# Patient Record
Sex: FEMALE | Race: OTHER | NOT HISPANIC OR LATINO | URBAN - METROPOLITAN AREA
[De-identification: names, ages, dates, MRNs, and addresses within clinical notes are randomized per-mention and may not be internally consistent; named-entity substitution may affect disease eponyms.]

---

## 2018-02-16 ENCOUNTER — INPATIENT (INPATIENT)
Facility: HOSPITAL | Age: 83
LOS: 18 days | Discharge: HOME | DRG: 871 | End: 2018-03-07
Attending: HOSPITALIST | Admitting: HOSPITALIST
Payer: MEDICAID

## 2018-02-16 VITALS
HEART RATE: 78 BPM | SYSTOLIC BLOOD PRESSURE: 137 MMHG | TEMPERATURE: 98 F | OXYGEN SATURATION: 97 % | RESPIRATION RATE: 28 BRPM | DIASTOLIC BLOOD PRESSURE: 59 MMHG

## 2018-02-16 LAB
ALBUMIN SERPL ELPH-MCNC: 2.2 G/DL — LOW (ref 3.3–5.2)
ALP SERPL-CCNC: 96 U/L — SIGNIFICANT CHANGE UP (ref 40–120)
ALT FLD-CCNC: 33 U/L — HIGH
ANION GAP SERPL CALC-SCNC: 11 MMOL/L — SIGNIFICANT CHANGE UP (ref 5–17)
AST SERPL-CCNC: 34 U/L — HIGH
BASOPHILS # BLD AUTO: 0 K/UL — SIGNIFICANT CHANGE UP (ref 0–0.2)
BASOPHILS NFR BLD AUTO: 0.2 % — SIGNIFICANT CHANGE UP (ref 0–2)
BILIRUB SERPL-MCNC: 0.3 MG/DL — LOW (ref 0.4–2)
BUN SERPL-MCNC: 29 MG/DL — HIGH (ref 8–20)
CALCIUM SERPL-MCNC: 8.6 MG/DL — SIGNIFICANT CHANGE UP (ref 8.6–10.2)
CHLORIDE SERPL-SCNC: 98 MMOL/L — SIGNIFICANT CHANGE UP (ref 98–107)
CO2 SERPL-SCNC: 26 MMOL/L — SIGNIFICANT CHANGE UP (ref 22–29)
CREAT SERPL-MCNC: 0.42 MG/DL — LOW (ref 0.5–1.3)
EOSINOPHIL # BLD AUTO: 0.6 K/UL — HIGH (ref 0–0.5)
EOSINOPHIL NFR BLD AUTO: 7 % — HIGH (ref 0–6)
GLUCOSE SERPL-MCNC: 207 MG/DL — HIGH (ref 70–115)
HCT VFR BLD CALC: 33.2 % — LOW (ref 37–47)
HGB BLD-MCNC: 10.4 G/DL — LOW (ref 12–16)
LACTATE BLDV-MCNC: 1.6 MMOL/L — SIGNIFICANT CHANGE UP (ref 0.5–2)
LYMPHOCYTES # BLD AUTO: 1.8 K/UL — SIGNIFICANT CHANGE UP (ref 1–4.8)
LYMPHOCYTES # BLD AUTO: 19.5 % — LOW (ref 20–55)
MCHC RBC-ENTMCNC: 27.7 PG — SIGNIFICANT CHANGE UP (ref 27–31)
MCHC RBC-ENTMCNC: 31.3 G/DL — LOW (ref 32–36)
MCV RBC AUTO: 88.5 FL — SIGNIFICANT CHANGE UP (ref 81–99)
MONOCYTES # BLD AUTO: 0.8 K/UL — SIGNIFICANT CHANGE UP (ref 0–0.8)
MONOCYTES NFR BLD AUTO: 9.1 % — SIGNIFICANT CHANGE UP (ref 3–10)
NEUTROPHILS # BLD AUTO: 5.8 K/UL — SIGNIFICANT CHANGE UP (ref 1.8–8)
NEUTROPHILS NFR BLD AUTO: 64.1 % — SIGNIFICANT CHANGE UP (ref 37–73)
PLATELET # BLD AUTO: 368 K/UL — SIGNIFICANT CHANGE UP (ref 150–400)
POTASSIUM SERPL-MCNC: 4.2 MMOL/L — SIGNIFICANT CHANGE UP (ref 3.5–5.3)
POTASSIUM SERPL-SCNC: 4.2 MMOL/L — SIGNIFICANT CHANGE UP (ref 3.5–5.3)
PROT SERPL-MCNC: 7.1 G/DL — SIGNIFICANT CHANGE UP (ref 6.6–8.7)
RBC # BLD: 3.75 M/UL — LOW (ref 4.4–5.2)
RBC # FLD: 15 % — SIGNIFICANT CHANGE UP (ref 11–15.6)
SODIUM SERPL-SCNC: 135 MMOL/L — SIGNIFICANT CHANGE UP (ref 135–145)
WBC # BLD: 9 K/UL — SIGNIFICANT CHANGE UP (ref 4.8–10.8)
WBC # FLD AUTO: 9 K/UL — SIGNIFICANT CHANGE UP (ref 4.8–10.8)

## 2018-02-16 PROCEDURE — 71045 X-RAY EXAM CHEST 1 VIEW: CPT | Mod: 26

## 2018-02-16 PROCEDURE — 93010 ELECTROCARDIOGRAM REPORT: CPT

## 2018-02-16 PROCEDURE — 99285 EMERGENCY DEPT VISIT HI MDM: CPT

## 2018-02-16 RX ORDER — VANCOMYCIN HCL 1 G
1000 VIAL (EA) INTRAVENOUS ONCE
Qty: 0 | Refills: 0 | Status: COMPLETED | OUTPATIENT
Start: 2018-02-16 | End: 2018-02-16

## 2018-02-16 RX ORDER — SODIUM CHLORIDE 9 MG/ML
1800 INJECTION INTRAMUSCULAR; INTRAVENOUS; SUBCUTANEOUS ONCE
Qty: 0 | Refills: 0 | Status: COMPLETED | OUTPATIENT
Start: 2018-02-16 | End: 2018-02-16

## 2018-02-16 RX ORDER — PIPERACILLIN AND TAZOBACTAM 4; .5 G/20ML; G/20ML
3.38 INJECTION, POWDER, LYOPHILIZED, FOR SOLUTION INTRAVENOUS ONCE
Qty: 0 | Refills: 0 | Status: COMPLETED | OUTPATIENT
Start: 2018-02-16 | End: 2018-02-16

## 2018-02-16 RX ORDER — SODIUM CHLORIDE 9 MG/ML
3 INJECTION INTRAMUSCULAR; INTRAVENOUS; SUBCUTANEOUS ONCE
Qty: 0 | Refills: 0 | Status: COMPLETED | OUTPATIENT
Start: 2018-02-16 | End: 2018-02-16

## 2018-02-16 RX ADMIN — Medication 250 MILLIGRAM(S): at 23:35

## 2018-02-16 RX ADMIN — SODIUM CHLORIDE 1200 MILLILITER(S): 9 INJECTION INTRAMUSCULAR; INTRAVENOUS; SUBCUTANEOUS at 21:34

## 2018-02-16 RX ADMIN — SODIUM CHLORIDE 3 MILLILITER(S): 9 INJECTION INTRAMUSCULAR; INTRAVENOUS; SUBCUTANEOUS at 21:34

## 2018-02-16 RX ADMIN — PIPERACILLIN AND TAZOBACTAM 200 GRAM(S): 4; .5 INJECTION, POWDER, LYOPHILIZED, FOR SOLUTION INTRAVENOUS at 21:33

## 2018-02-16 NOTE — ED ADULT TRIAGE NOTE - CHIEF COMPLAINT QUOTE
BIBA, no family present at triage, EMS reports family reported recent CVA and baseline is now nonverbal and confused. Family called 911 for respiratory distress. Patient noted with left visual gaze, right sided neglect, right sided weakness, non verbal. PEG tube present, indwelling catheter present with cloudy urine.

## 2018-02-16 NOTE — ED PROVIDER NOTE - SKIN, MLM
stage 4 sacral decubiti appears infected with discharge. Pt incontinent with loose brown stool tracing up to wound.

## 2018-02-16 NOTE — ED PROVIDER NOTE - PROGRESS NOTE DETAILS
Labs as noted.  Family never came to identify pt.  RN unable to replace ha because she cannot empty balloon.  Case d/w [facility]Dr. Adamson and will admit, continue Abx and supportive care

## 2018-02-16 NOTE — ED PROVIDER NOTE - UNABLE TO OBTAIN
Full history limited due to urgent need for care. Urgent need for Intervention Full ROS limited due to urgent need for care.

## 2018-02-16 NOTE — ED PROVIDER NOTE - MEDICAL DECISION MAKING DETAILS
Will do sepsis protocol, give fluids, antibiotics, obtain chest x-ray and re-evaluate. Awaiting to discuss pt status with family.

## 2018-02-16 NOTE — ED PROVIDER NOTE - OBJECTIVE STATEMENT
This is a 89 y/o F BIBA to ED for difficulty breathing. As per EMS family called due to patient belly breathing and appearing unwell. Pt with recent CVA causing R sided neglect.

## 2018-02-16 NOTE — ED ADULT NURSE NOTE - OBJECTIVE STATEMENT
as per family patient with SOB for couple days, + edema to right upper extremity, came with ha- dirty unknown when placed, patient non vewrbal

## 2018-02-17 DIAGNOSIS — Z96.0 PRESENCE OF UROGENITAL IMPLANTS: ICD-10-CM

## 2018-02-17 DIAGNOSIS — I63.9 CEREBRAL INFARCTION, UNSPECIFIED: ICD-10-CM

## 2018-02-17 DIAGNOSIS — A41.9 SEPSIS, UNSPECIFIED ORGANISM: ICD-10-CM

## 2018-02-17 DIAGNOSIS — R06.02 SHORTNESS OF BREATH: ICD-10-CM

## 2018-02-17 DIAGNOSIS — N39.0 URINARY TRACT INFECTION, SITE NOT SPECIFIED: ICD-10-CM

## 2018-02-17 DIAGNOSIS — Z93.1 GASTROSTOMY STATUS: Chronic | ICD-10-CM

## 2018-02-17 DIAGNOSIS — L89.154 PRESSURE ULCER OF SACRAL REGION, STAGE 4: ICD-10-CM

## 2018-02-17 LAB
APPEARANCE UR: ABNORMAL
BACTERIA # UR AUTO: ABNORMAL
BILIRUB UR-MCNC: NEGATIVE — SIGNIFICANT CHANGE UP
COLOR SPEC: YELLOW — SIGNIFICANT CHANGE UP
DIFF PNL FLD: ABNORMAL
EPI CELLS # UR: SIGNIFICANT CHANGE UP
GLUCOSE UR QL: NEGATIVE MG/DL — SIGNIFICANT CHANGE UP
KETONES UR-MCNC: NEGATIVE — SIGNIFICANT CHANGE UP
LEUKOCYTE ESTERASE UR-ACNC: ABNORMAL
NITRITE UR-MCNC: NEGATIVE — SIGNIFICANT CHANGE UP
PH UR: 8 — SIGNIFICANT CHANGE UP (ref 5–8)
PROT UR-MCNC: 15 MG/DL
RBC CASTS # UR COMP ASSIST: SIGNIFICANT CHANGE UP /HPF (ref 0–4)
SP GR SPEC: 1.01 — SIGNIFICANT CHANGE UP (ref 1.01–1.02)
UROBILINOGEN FLD QL: 4 MG/DL
WBC UR QL: SIGNIFICANT CHANGE UP

## 2018-02-17 PROCEDURE — 71250 CT THORAX DX C-: CPT | Mod: 26

## 2018-02-17 PROCEDURE — 12345: CPT | Mod: NC

## 2018-02-17 RX ORDER — PIPERACILLIN AND TAZOBACTAM 4; .5 G/20ML; G/20ML
3.38 INJECTION, POWDER, LYOPHILIZED, FOR SOLUTION INTRAVENOUS EVERY 8 HOURS
Qty: 0 | Refills: 0 | Status: DISCONTINUED | OUTPATIENT
Start: 2018-02-17 | End: 2018-02-19

## 2018-02-17 RX ORDER — VANCOMYCIN HCL 1 G
1000 VIAL (EA) INTRAVENOUS EVERY 12 HOURS
Qty: 0 | Refills: 0 | Status: DISCONTINUED | OUTPATIENT
Start: 2018-02-17 | End: 2018-02-18

## 2018-02-17 RX ORDER — SODIUM CHLORIDE 9 MG/ML
1000 INJECTION INTRAMUSCULAR; INTRAVENOUS; SUBCUTANEOUS
Qty: 0 | Refills: 0 | Status: DISCONTINUED | OUTPATIENT
Start: 2018-02-17 | End: 2018-02-19

## 2018-02-17 RX ORDER — ENOXAPARIN SODIUM 100 MG/ML
40 INJECTION SUBCUTANEOUS EVERY 24 HOURS
Qty: 0 | Refills: 0 | Status: DISCONTINUED | OUTPATIENT
Start: 2018-02-17 | End: 2018-02-20

## 2018-02-17 RX ADMIN — Medication 250 MILLIGRAM(S): at 23:56

## 2018-02-17 RX ADMIN — SODIUM CHLORIDE 125 MILLILITER(S): 9 INJECTION INTRAMUSCULAR; INTRAVENOUS; SUBCUTANEOUS at 17:45

## 2018-02-17 RX ADMIN — Medication 250 MILLIGRAM(S): at 13:00

## 2018-02-17 RX ADMIN — PIPERACILLIN AND TAZOBACTAM 25 GRAM(S): 4; .5 INJECTION, POWDER, LYOPHILIZED, FOR SOLUTION INTRAVENOUS at 06:33

## 2018-02-17 RX ADMIN — PIPERACILLIN AND TAZOBACTAM 25 GRAM(S): 4; .5 INJECTION, POWDER, LYOPHILIZED, FOR SOLUTION INTRAVENOUS at 17:44

## 2018-02-17 RX ADMIN — SODIUM CHLORIDE 125 MILLILITER(S): 9 INJECTION INTRAMUSCULAR; INTRAVENOUS; SUBCUTANEOUS at 06:33

## 2018-02-17 RX ADMIN — ENOXAPARIN SODIUM 40 MILLIGRAM(S): 100 INJECTION SUBCUTANEOUS at 06:34

## 2018-02-17 NOTE — CONSULT NOTE ADULT - SUBJECTIVE AND OBJECTIVE BOX
INTERVAL HPI/OVERNIGHT EVENTS:  89 yo f w/ hx of stroke, bed-bound, non-verbal presented to hospital with respiratory distress, was found to have sacral pressure ulcer stage 4, 7x8cm, w/ some necrotic tissue; medical service has asked for possible surgical debridement of the necrotic tissue.    MEDICATIONS  (STANDING):  enoxaparin Injectable 40 milliGRAM(s) SubCutaneous every 24 hours  piperacillin/tazobactam IVPB. 3.375 Gram(s) IV Intermittent every 8 hours  sodium chloride 0.9%. 1000 milliLiter(s) (125 mL/Hr) IV Continuous <Continuous>  vancomycin  IVPB 1000 milliGRAM(s) IV Intermittent every 12 hours    MEDICATIONS  (PRN):      Vital Signs Last 24 Hrs  T(C): 36.8 (2018 15:26), Max: 37.2 (2018 03:42)  T(F): 98.3 (2018 15:), Max: 99 (2018 03:42)  HR: 114 (2018 15:) (78 - 114)  BP: 144/73 (2018 15:26) (137/59 - 171/67)  BP(mean): 90 (2018 03:01) (90 - 90)  RR: 20 (2018 15:26) (16 - 28)  SpO2: 96% (2018 15:26) (96% - 100%)    PE  Gen: NAD  Pulm: no signs of respiratory distress  Abd: s/nd/nt  Back: sacral pressure ulcer stage 4, 7x8cm, w/ some necrotic tissue      I&O's Detail      LABS:                        10.4   9.0   )-----------( 368      ( 2018 21:38 )             33.2     02-16    135  |  98  |  29.0<H>  ----------------------------<  207<H>  4.2   |  26.0  |  0.42<L>    Ca    8.6      2018 21:38    TPro  7.1  /  Alb  2.2<L>  /  TBili  0.3<L>  /  DBili  x   /  AST  34<H>  /  ALT  33<H>  /  AlkPhos  96  02-16      Urinalysis Basic - ( 2018 03:13 )    Color: Yellow / Appearance: Slightly Turbid / S.010 / pH: x  Gluc: x / Ketone: Negative  / Bili: Negative / Urobili: 4 mg/dL   Blood: x / Protein: 15 mg/dL / Nitrite: Negative   Leuk Esterase: Moderate / RBC: 0-2 /HPF / WBC 3-5   Sq Epi: x / Non Sq Epi: Occasional / Bacteria: Many        RADIOLOGY & ADDITIONAL STUDIES:

## 2018-02-17 NOTE — PROGRESS NOTE ADULT - PROBLEM SELECTOR PLAN 1
Currently sat of 97% on RA, BP normal, PE is in differential however more likely had mucous plug or aspirating on oral secretions. No clear evidence of pneumonic process. Cont. aspiration precautions, will monitor.

## 2018-02-17 NOTE — H&P ADULT - PROBLEM SELECTOR PLAN 3
Likely due to sacral decub and possible catheter associated UTI. At this point need to address advanced directives and goals of care with Family when they can be reached. Patient with advanced aged, bedbound, non-verbal. Palliative care consult when family present. Lactate normal, normotensive at this time. Likely due to sacral decub. At this point need to address advanced directives and goals of care with Family when they can be reached. Patient with advanced aged, bedbound, non-verbal. Palliative care consult when family present. Lactate normal, normotensive at this time.

## 2018-02-17 NOTE — CONSULT NOTE ADULT - ASSESSMENT
89 yo f w/ hx of stroke, bed-bound, non-verbal presented to hospital with respiratory distress, was found to have sacral pressure ulcer stage 4, 7x8cm, w/ some necrotic tissue; medical service has asked for possible surgical debridement of the necrotic tissue.    pt was seen and examined at bedside. per primary team note, family not certain about goals of care, and that unsure regarding aggressive treatment/surgical intervention for this patient, and that they will decide in the near future.   - 2 phone numbers in the chart, [phone]/ [phone] were contacted,  with no family member of this patient available; Therefore no surgical intervention at this time   - Please confirm goals of care for the patient, and arrange to have family members at bedside in case they would like for patient to have extensive surgical debridement of the wound  - local wound care with santyl for now  - surgery is signing off, please reconsult prn    Discussed with Dr. Medley

## 2018-02-17 NOTE — PROGRESS NOTE ADULT - ASSESSMENT
89 y/o female with resolved SOB, Stage 4 sacral decub, Young catheter POA, Hx of CVA with right sided paresis, s/p PEG for dysphagia  caryn with patient Nephew Mr Sanchez Bello, we had long discussion about the issues and plan of care, according to him she came from [facility] in June 2017, after one [place] of arrival to [place], she got stroke and was admitted at [facility], where she was treated for 1 week, then discharged home, then she was admitted again because of SOB resulted from aspiration pneumonia  and developed another stroke and she became aphasic in end of the august, she was at [facility] Pegged and foleys cather was put in, she was in [facility] for 6 months, he does not know the reason why they kept her there for 6 month, she was discharged home with him in January, he was given her bolus feeding, he notice she was breathing hard and brought her here.   we discussed about management she is getting her for aspiration as well as stage 4 decubitus ulcer with infection, as per him at [facility] she developed stage 2 ulcer, we discussed about her advance directive, he is her only relative here and he wanted her to be DNR and DNI, he is also interested in meeting with Palliative and hospice team, will have meeting on Monday, MOLTS form filled and signed, mean while will continued with medical management and no aggressive measures.

## 2018-02-17 NOTE — PROGRESS NOTE ADULT - PROBLEM SELECTOR PLAN 3
Likely due to sacral decub, stage 4 with necrotic margin, advanced directives and goals of care discuss with Family as mentioned above, given advanced aged, bedbound, non-verbal, patient was made DNI and DNI as per family wishes, will have Hospice and Palliative care meeting on monday,  Lactate normal, normotensive at this time.

## 2018-02-17 NOTE — PROGRESS NOTE ADULT - PROBLEM SELECTOR PLAN 4
Wet to dry, frequent turning, await family to asses goals of care and if wish to proceed with aggressive care will need surgical evaluation for debridement done, will have meeting on Monday for further decision, as of now medical management.

## 2018-02-17 NOTE — H&P ADULT - PROBLEM SELECTOR PLAN 4
Wet to dry, frequent turning, await family to asses goals of care and if wish to proceed with aggressive care will need surgical evaluation for debridement.

## 2018-02-17 NOTE — H&P ADULT - HISTORY OF PRESENT ILLNESS
89 y/o female brought in by EMS from home after family noticed patient to have resp. distress. Patient family apparently came to ED before EMS got here and left some paperwork but then left and as of now have been unable to be reached. Patient has a history of admission to [facility] in 8/17 with large CVA resulting in right sided paresis, non-verbal, bedbound state. She was dcd with PEG and Ha catheter. This information was obtained from records left by family, however no other information is obtainable as family is unable to be reached. It is not know if patient has any home services, or what post hospital care has been. In ED patient with ha with turbid urine and was unable to be removed after deflating balloon and appears to have been in place for extended period of time. She also has a large 7x7 stage 4 sacral decub with tunneling and foul smelling discharge.

## 2018-02-17 NOTE — H&P ADULT - PROBLEM SELECTOR PLAN 2
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 F/U UA, will need to have urology change at this point as it is unable to be removed likely due to balloon not deflating due to crystallized sediment around it. 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 U/A not c/w active infection and more c/w chronic colonization, will need to have urology change at this point as it is unable to be removed likely due to balloon not deflating due to crystallized sediment around it.

## 2018-02-17 NOTE — PROGRESS NOTE ADULT - SUBJECTIVE AND OBJECTIVE BOX
[facility]    [id number]    89y      Female    Patient is a 89y old  Female who presents with a chief complaint of SOB (2018 03:01)      INTERVAL HPI/OVERNIGHT EVENTS:    Patient is seen and evaluated, unable to get much info from the patient as she is aphasic     REVIEW OF SYSTEMS:    Unable to get much info from the patient.      Vital Signs Last 24 Hrs  T(C): 37.2 (2018 21:55), Max: 37.2 (2018 03:42)  T(F): 98.9 (2018 21:55), Max: 99 (2018 03:42)  HR: 116 (2018 21:55) (78 - 116)  BP: 144/73 (2018 15:26) (137/67 - 171/67)  BP(mean): 90 (2018 03:01) (90 - 90)  RR: 20 (2018 21:55) (16 - 25)  SpO2: 96% (2018 15:26) (96% - 100%)    PHYSICAL EXAM:    GENERAL: Elderly female looking lethargic  NECK: soft, Supple, No JVD,   CHEST/LUNG: decrease air entry bilaterally; No wheezing  HEART: S1S2+, Regular rate and rhythm; No murmurs  ABDOMEN: Soft, Nontender, Nondistended; Bowel sounds present  EXTREMITIES:  1+ Peripheral Pulses, No edema  SKIN: Stage 4 decubitus sacral ulcer 4, 7x8cm, w/ some necrotic tissue, foul smelling wound.   NEURO: she is Aphasic, not participating in the exam.       LABS:                        10.4   9.0   )-----------( 368      ( 2018 21:38 )             33.2     02-    135  |  98  |  29.0<H>  ----------------------------<  207<H>  4.2   |  26.0  |  0.42<L>    Ca    8.6      2018 21:38    TPro  7.1  /  Alb  2.2<L>  /  TBili  0.3<L>  /  DBili  x   /  AST  34<H>  /  ALT  33<H>  /  AlkPhos  96  -16      Urinalysis Basic - ( 2018 03:13 )    Color: Yellow / Appearance: Slightly Turbid / S.010 / pH: x  Gluc: x / Ketone: Negative  / Bili: Negative / Urobili: 4 mg/dL   Blood: x / Protein: 15 mg/dL / Nitrite: Negative   Leuk Esterase: Moderate / RBC: 0-2 /HPF / WBC 3-5   Sq Epi: x / Non Sq Epi: Occasional / Bacteria: Many          I&O's Summary    2018 07:01  -  2018 23:13  --------------------------------------------------------  IN: 0 mL / OUT: 890 mL / NET: -890 mL        MEDICATIONS  (STANDING):  enoxaparin Injectable 40 milliGRAM(s) SubCutaneous every 24 hours  piperacillin/tazobactam IVPB. 3.375 Gram(s) IV Intermittent every 8 hours  sodium chloride 0.9%. 1000 milliLiter(s) (125 mL/Hr) IV Continuous <Continuous>  vancomycin  IVPB 1000 milliGRAM(s) IV Intermittent every 12 hours    MEDICATIONS  (PRN):

## 2018-02-17 NOTE — ED ADULT NURSE REASSESSMENT NOTE - NS ED NURSE REASSESS COMMENT FT1
Pt is resting in bed comfortably at this time, no apparent distress noted at this time. pt safety maintained. Pt remains baseline mental status. IV fluids running as per orders, will continue to monitor.
patient came into ER with wound stage 4, family aware, approximately 7cm by 7cm, sloathing, tunneling with flap, redness, dressing applied wet to dry, tolerated well, +BM loose, care given and repositioned for comfort
patient returned from cat scan, placed back on monitor, repositioned for comfort
MD notified @ 2230 unable to change ha, unable to deflate balloon due to severe crusting at dip, attempted to flush balloon site and draw back on site, unable to complete task, MDA, 2 RN attempt

## 2018-02-17 NOTE — H&P ADULT - PROBLEM SELECTOR PLAN 1
Currently sat of 97% on RA, BP normal, PE is in differential however more likely had mucous plug or aspirating on oral secretions. No clear evidence of pneumonic process. Cont. aspiration precautions, DVT-P.

## 2018-02-18 DIAGNOSIS — E78.5 HYPERLIPIDEMIA, UNSPECIFIED: ICD-10-CM

## 2018-02-18 DIAGNOSIS — I71.2 THORACIC AORTIC ANEURYSM, WITHOUT RUPTURE: ICD-10-CM

## 2018-02-18 DIAGNOSIS — M79.89 OTHER SPECIFIED SOFT TISSUE DISORDERS: ICD-10-CM

## 2018-02-18 DIAGNOSIS — I10 ESSENTIAL (PRIMARY) HYPERTENSION: ICD-10-CM

## 2018-02-18 LAB
ALBUMIN SERPL ELPH-MCNC: 2.2 G/DL — LOW (ref 3.3–5.2)
ALP SERPL-CCNC: 61 U/L — SIGNIFICANT CHANGE UP (ref 40–120)
ALT FLD-CCNC: 18 U/L — SIGNIFICANT CHANGE UP
ANION GAP SERPL CALC-SCNC: 12 MMOL/L — SIGNIFICANT CHANGE UP (ref 5–17)
AST SERPL-CCNC: 19 U/L — SIGNIFICANT CHANGE UP
BASOPHILS # BLD AUTO: 0 K/UL — SIGNIFICANT CHANGE UP (ref 0–0.2)
BASOPHILS NFR BLD AUTO: 0.2 % — SIGNIFICANT CHANGE UP (ref 0–2)
BILIRUB SERPL-MCNC: 0.5 MG/DL — SIGNIFICANT CHANGE UP (ref 0.4–2)
BUN SERPL-MCNC: 17 MG/DL — SIGNIFICANT CHANGE UP (ref 8–20)
CALCIUM SERPL-MCNC: 8.5 MG/DL — LOW (ref 8.6–10.2)
CHLORIDE SERPL-SCNC: 102 MMOL/L — SIGNIFICANT CHANGE UP (ref 98–107)
CO2 SERPL-SCNC: 23 MMOL/L — SIGNIFICANT CHANGE UP (ref 22–29)
CREAT SERPL-MCNC: 0.39 MG/DL — LOW (ref 0.5–1.3)
CULTURE RESULTS: SIGNIFICANT CHANGE UP
EOSINOPHIL # BLD AUTO: 1.1 K/UL — HIGH (ref 0–0.5)
EOSINOPHIL NFR BLD AUTO: 12 % — HIGH (ref 0–6)
GLUCOSE SERPL-MCNC: 132 MG/DL — HIGH (ref 70–115)
HCT VFR BLD CALC: 27.9 % — LOW (ref 37–47)
HGB BLD-MCNC: 8.8 G/DL — LOW (ref 12–16)
INR BLD: 1.3 RATIO — HIGH (ref 0.88–1.16)
LYMPHOCYTES # BLD AUTO: 1 K/UL — SIGNIFICANT CHANGE UP (ref 1–4.8)
LYMPHOCYTES # BLD AUTO: 11.1 % — LOW (ref 20–55)
MCHC RBC-ENTMCNC: 27.8 PG — SIGNIFICANT CHANGE UP (ref 27–31)
MCHC RBC-ENTMCNC: 31.5 G/DL — LOW (ref 32–36)
MCV RBC AUTO: 88.3 FL — SIGNIFICANT CHANGE UP (ref 81–99)
MONOCYTES # BLD AUTO: 0.9 K/UL — HIGH (ref 0–0.8)
MONOCYTES NFR BLD AUTO: 9.5 % — SIGNIFICANT CHANGE UP (ref 3–10)
NEUTROPHILS # BLD AUTO: 6.2 K/UL — SIGNIFICANT CHANGE UP (ref 1.8–8)
NEUTROPHILS NFR BLD AUTO: 67 % — SIGNIFICANT CHANGE UP (ref 37–73)
PLATELET # BLD AUTO: 343 K/UL — SIGNIFICANT CHANGE UP (ref 150–400)
POTASSIUM SERPL-MCNC: 3.6 MMOL/L — SIGNIFICANT CHANGE UP (ref 3.5–5.3)
POTASSIUM SERPL-SCNC: 3.6 MMOL/L — SIGNIFICANT CHANGE UP (ref 3.5–5.3)
PROT SERPL-MCNC: 5.9 G/DL — LOW (ref 6.6–8.7)
PROTHROM AB SERPL-ACNC: 14.4 SEC — HIGH (ref 9.8–12.7)
RBC # BLD: 3.16 M/UL — LOW (ref 4.4–5.2)
RBC # FLD: 14.9 % — SIGNIFICANT CHANGE UP (ref 11–15.6)
SODIUM SERPL-SCNC: 137 MMOL/L — SIGNIFICANT CHANGE UP (ref 135–145)
SPECIMEN SOURCE: SIGNIFICANT CHANGE UP
VANCOMYCIN TROUGH SERPL-MCNC: 20.6 UG/ML — HIGH (ref 10–20)
WBC # BLD: 9.2 K/UL — SIGNIFICANT CHANGE UP (ref 4.8–10.8)
WBC # FLD AUTO: 9.2 K/UL — SIGNIFICANT CHANGE UP (ref 4.8–10.8)

## 2018-02-18 PROCEDURE — 99233 SBSQ HOSP IP/OBS HIGH 50: CPT

## 2018-02-18 PROCEDURE — 93971 EXTREMITY STUDY: CPT | Mod: 26,RT

## 2018-02-18 RX ORDER — ASPIRIN/CALCIUM CARB/MAGNESIUM 324 MG
325 TABLET ORAL DAILY
Qty: 0 | Refills: 0 | Status: DISCONTINUED | OUTPATIENT
Start: 2018-02-18 | End: 2018-02-20

## 2018-02-18 RX ORDER — CLOPIDOGREL BISULFATE 75 MG/1
75 TABLET, FILM COATED ORAL DAILY
Qty: 0 | Refills: 0 | Status: DISCONTINUED | OUTPATIENT
Start: 2018-02-18 | End: 2018-02-20

## 2018-02-18 RX ORDER — SCOPALAMINE 1 MG/3D
1 PATCH, EXTENDED RELEASE TRANSDERMAL
Qty: 0 | Refills: 0 | Status: DISCONTINUED | OUTPATIENT
Start: 2018-02-18 | End: 2018-03-07

## 2018-02-18 RX ORDER — METOPROLOL TARTRATE 50 MG
50 TABLET ORAL
Qty: 0 | Refills: 0 | Status: DISCONTINUED | OUTPATIENT
Start: 2018-02-18 | End: 2018-02-20

## 2018-02-18 RX ORDER — ACETAMINOPHEN 500 MG
500 TABLET ORAL
Qty: 0 | Refills: 0 | Status: DISCONTINUED | OUTPATIENT
Start: 2018-02-18 | End: 2018-03-07

## 2018-02-18 RX ORDER — FAMOTIDINE 10 MG/ML
20 INJECTION INTRAVENOUS DAILY
Qty: 0 | Refills: 0 | Status: DISCONTINUED | OUTPATIENT
Start: 2018-02-18 | End: 2018-02-20

## 2018-02-18 RX ORDER — ATORVASTATIN CALCIUM 80 MG/1
40 TABLET, FILM COATED ORAL AT BEDTIME
Qty: 0 | Refills: 0 | Status: DISCONTINUED | OUTPATIENT
Start: 2018-02-18 | End: 2018-02-20

## 2018-02-18 RX ORDER — VANCOMYCIN HCL 1 G
750 VIAL (EA) INTRAVENOUS
Qty: 0 | Refills: 0 | Status: DISCONTINUED | OUTPATIENT
Start: 2018-02-18 | End: 2018-02-19

## 2018-02-18 RX ORDER — ACETAMINOPHEN 500 MG
500 TABLET ORAL
Qty: 0 | Refills: 0 | Status: DISCONTINUED | OUTPATIENT
Start: 2018-02-18 | End: 2018-02-18

## 2018-02-18 RX ADMIN — ATORVASTATIN CALCIUM 40 MILLIGRAM(S): 80 TABLET, FILM COATED ORAL at 22:28

## 2018-02-18 RX ADMIN — CLOPIDOGREL BISULFATE 75 MILLIGRAM(S): 75 TABLET, FILM COATED ORAL at 12:11

## 2018-02-18 RX ADMIN — Medication 150 MILLIGRAM(S): at 23:02

## 2018-02-18 RX ADMIN — Medication 50 MILLIGRAM(S): at 17:48

## 2018-02-18 RX ADMIN — ENOXAPARIN SODIUM 40 MILLIGRAM(S): 100 INJECTION SUBCUTANEOUS at 05:56

## 2018-02-18 RX ADMIN — PIPERACILLIN AND TAZOBACTAM 25 GRAM(S): 4; .5 INJECTION, POWDER, LYOPHILIZED, FOR SOLUTION INTRAVENOUS at 02:10

## 2018-02-18 RX ADMIN — PIPERACILLIN AND TAZOBACTAM 25 GRAM(S): 4; .5 INJECTION, POWDER, LYOPHILIZED, FOR SOLUTION INTRAVENOUS at 17:35

## 2018-02-18 RX ADMIN — Medication 250 MILLIGRAM(S): at 13:21

## 2018-02-18 RX ADMIN — SCOPALAMINE 1 PATCH: 1 PATCH, EXTENDED RELEASE TRANSDERMAL at 12:12

## 2018-02-18 RX ADMIN — SODIUM CHLORIDE 125 MILLILITER(S): 9 INJECTION INTRAMUSCULAR; INTRAVENOUS; SUBCUTANEOUS at 22:28

## 2018-02-18 RX ADMIN — PIPERACILLIN AND TAZOBACTAM 25 GRAM(S): 4; .5 INJECTION, POWDER, LYOPHILIZED, FOR SOLUTION INTRAVENOUS at 09:23

## 2018-02-18 RX ADMIN — Medication 325 MILLIGRAM(S): at 12:11

## 2018-02-18 RX ADMIN — FAMOTIDINE 20 MILLIGRAM(S): 10 INJECTION INTRAVENOUS at 12:12

## 2018-02-18 NOTE — PROGRESS NOTE ADULT - SUBJECTIVE AND OBJECTIVE BOX
[facility]    [id number]    89y      Female    Patient is a 89y old  Female who presents with a chief complaint of SOB (2018 03:01)      INTERVAL HPI/OVERNIGHT EVENTS:    Patient is seen and evaluated, unable to get much info from the patient as she is aphasic     REVIEW OF SYSTEMS:    Unable to get much info from the patient.      Vital Signs Last 24 Hrs  T(C): 37.2 (2018 21:55), Max: 37.2 (2018 21:55)  T(F): 98.9 (2018 21:55), Max: 98.9 (2018 21:55)  HR: 116 (2018 21:55) (114 - 116)  BP: 144/73 (2018 15:26) (144/73 - 144/73)  BP(mean): --  RR: 20 (2018 21:55) (20 - 20)  SpO2: 96% (2018 15:) (96% - 96%)    PHYSICAL EXAM:    GENERAL: Elderly female looking comfortable  NECK: soft, Supple, No JVD,   CHEST/LUNG: decrease air entry bilaterally; No wheezing  HEART: S1S2+, Regular rate and rhythm; No murmurs  ABDOMEN: Soft, Nontender, Nondistended; Bowel sounds present  EXTREMITIES:  1+ Peripheral Pulses, right arm swelling.   SKIN: Stage 4 decubitus sacral ulcer 4, 7x8cm, w/ some necrotic tissue, foul smelling wound.   NEURO: she is Aphasic, not participating in the exam.         LABS:                        10.4   9.0   )-----------( 368      ( 2018 21:38 )             33.2     02-18    137  |  102  |  17.0  ----------------------------<  132<H>  3.6   |  23.0  |  0.39<L>    Ca    8.5<L>      2018 07:25    TPro  5.9<L>  /  Alb  2.2<L>  /  TBili  0.5  /  DBili  x   /  AST  19  /  ALT  18  /  AlkPhos  61  02-18    PT/INR - ( 2018 07:25 )   PT: 14.4 sec;   INR: 1.30 ratio           Urinalysis Basic - ( 2018 03:13 )    Color: Yellow / Appearance: Slightly Turbid / S.010 / pH: x  Gluc: x / Ketone: Negative  / Bili: Negative / Urobili: 4 mg/dL   Blood: x / Protein: 15 mg/dL / Nitrite: Negative   Leuk Esterase: Moderate / RBC: 0-2 /HPF / WBC 3-5   Sq Epi: x / Non Sq Epi: Occasional / Bacteria: Many          I&O's Summary    2018 07:01  -  2018 07:00  --------------------------------------------------------  IN: 1500 mL / OUT: 1090 mL / NET: 410 mL        MEDICATIONS  (STANDING):  enoxaparin Injectable 40 milliGRAM(s) SubCutaneous every 24 hours  piperacillin/tazobactam IVPB. 3.375 Gram(s) IV Intermittent every 8 hours  sodium chloride 0.9%. 1000 milliLiter(s) (125 mL/Hr) IV Continuous <Continuous>  vancomycin  IVPB 1000 milliGRAM(s) IV Intermittent every 12 hours    MEDICATIONS  (PRN):

## 2018-02-18 NOTE — PROGRESS NOTE ADULT - PROBLEM SELECTOR PLAN 5
as per discharge paper patient is on plavix, will continued along with statins. as per discharge paper patient is on plavix, aspirin 325mg, will continued along with statins.

## 2018-02-18 NOTE — PROGRESS NOTE ADULT - ASSESSMENT
89 y/o female with resolved SOB, Stage 4 sacral decub, Young catheter POA, Hx of CVA with right sided paresis, s/p PEG for dysphagia  caryn with patient Nephew Mr Sanchez Bello, we had long discussion about the issues and plan of care, according to him she came from [facility] in June 2017, after one [place] of arrival to [place], she got stroke and was admitted at [facility], where she was treated for 1 week, then discharged home, then she was admitted again because of SOB resulted from aspiration pneumonia  and developed another stroke and she became aphasic in end of the august, she was at [facility] Pegged and foleys cather was put in, she was in [facility] for 6 months, he does not know the reason why they kept her there for 6 month, she was discharged home with him in January, he was given her bolus feeding, he notice she was breathing hard and brought her here.   we discussed about management she is getting her for aspiration as well as stage 4 decubitus ulcer with infection, as per him at [facility] she developed stage 2 ulcer, we discussed about her advance directive, he is her only relative here and he wanted her to be DNR and DNI, he is also interested in meeting with Palliative and hospice team, will have meeting on Monday, MOLTS form filled and signed, mean while will continued with medical management and no aggressive measures.

## 2018-02-18 NOTE — PROGRESS NOTE ADULT - PROBLEM SELECTOR PLAN 1
Currently sat of 97% on RA, BP normal, PE is in differential however more likely had mucous plug or aspirating on oral secretions. No clear evidence of pneumonic process. Cont. 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

## 2018-02-18 NOTE — PROGRESS NOTE ADULT - PROBLEM SELECTOR PLAN 3
Likely due to sacral decub, stage 4 with necrotic margin, advanced directives and goals of care discuss with Family as mentioned above, given advanced aged, bedbound, non-verbal, patient was made DNI and DNI as per family wishes, will have Hospice and Palliative care meeting on Monday,  Lactate normal, sepsis resolving, normotensive at this time.

## 2018-02-19 LAB
-  AMIKACIN: SIGNIFICANT CHANGE UP
-  AMIKACIN: SIGNIFICANT CHANGE UP
-  AMPICILLIN/SULBACTAM: SIGNIFICANT CHANGE UP
-  AMPICILLIN/SULBACTAM: SIGNIFICANT CHANGE UP
-  AMPICILLIN: SIGNIFICANT CHANGE UP
-  AZTREONAM: SIGNIFICANT CHANGE UP
-  AZTREONAM: SIGNIFICANT CHANGE UP
-  CEFAZOLIN: SIGNIFICANT CHANGE UP
-  CEFAZOLIN: SIGNIFICANT CHANGE UP
-  CEFEPIME: SIGNIFICANT CHANGE UP
-  CEFEPIME: SIGNIFICANT CHANGE UP
-  CEFOXITIN: SIGNIFICANT CHANGE UP
-  CEFOXITIN: SIGNIFICANT CHANGE UP
-  CEFTAZIDIME: SIGNIFICANT CHANGE UP
-  CEFTAZIDIME: SIGNIFICANT CHANGE UP
-  CEFTRIAXONE: SIGNIFICANT CHANGE UP
-  CEFTRIAXONE: SIGNIFICANT CHANGE UP
-  CIPROFLOXACIN: SIGNIFICANT CHANGE UP
-  CIPROFLOXACIN: SIGNIFICANT CHANGE UP
-  ERTAPENEM: SIGNIFICANT CHANGE UP
-  ERTAPENEM: SIGNIFICANT CHANGE UP
-  ERYTHROMYCIN: SIGNIFICANT CHANGE UP
-  GENTAMICIN: SIGNIFICANT CHANGE UP
-  GENTAMICIN: SIGNIFICANT CHANGE UP
-  IMIPENEM: SIGNIFICANT CHANGE UP
-  IMIPENEM: SIGNIFICANT CHANGE UP
-  LEVOFLOXACIN: SIGNIFICANT CHANGE UP
-  LEVOFLOXACIN: SIGNIFICANT CHANGE UP
-  MEROPENEM: SIGNIFICANT CHANGE UP
-  MEROPENEM: SIGNIFICANT CHANGE UP
-  PIPERACILLIN/TAZOBACTAM: SIGNIFICANT CHANGE UP
-  PIPERACILLIN/TAZOBACTAM: SIGNIFICANT CHANGE UP
-  TETRACYCLINE: SIGNIFICANT CHANGE UP
-  TOBRAMYCIN: SIGNIFICANT CHANGE UP
-  TOBRAMYCIN: SIGNIFICANT CHANGE UP
-  TRIMETHOPRIM/SULFAMETHOXAZOLE: SIGNIFICANT CHANGE UP
-  TRIMETHOPRIM/SULFAMETHOXAZOLE: SIGNIFICANT CHANGE UP
-  VANCOMYCIN: SIGNIFICANT CHANGE UP
ANION GAP SERPL CALC-SCNC: 12 MMOL/L — SIGNIFICANT CHANGE UP (ref 5–17)
BUN SERPL-MCNC: 14 MG/DL — SIGNIFICANT CHANGE UP (ref 8–20)
CALCIUM SERPL-MCNC: 8.1 MG/DL — LOW (ref 8.6–10.2)
CHLORIDE SERPL-SCNC: 104 MMOL/L — SIGNIFICANT CHANGE UP (ref 98–107)
CO2 SERPL-SCNC: 22 MMOL/L — SIGNIFICANT CHANGE UP (ref 22–29)
CREAT SERPL-MCNC: 0.43 MG/DL — LOW (ref 0.5–1.3)
GLUCOSE SERPL-MCNC: 194 MG/DL — HIGH (ref 70–115)
HCT VFR BLD CALC: 30.8 % — LOW (ref 37–47)
HGB BLD-MCNC: 9.3 G/DL — LOW (ref 12–16)
MCHC RBC-ENTMCNC: 27 PG — SIGNIFICANT CHANGE UP (ref 27–31)
MCHC RBC-ENTMCNC: 30.2 G/DL — LOW (ref 32–36)
MCV RBC AUTO: 89.3 FL — SIGNIFICANT CHANGE UP (ref 81–99)
METHOD TYPE: SIGNIFICANT CHANGE UP
PLATELET # BLD AUTO: 328 K/UL — SIGNIFICANT CHANGE UP (ref 150–400)
POTASSIUM SERPL-MCNC: 3.7 MMOL/L — SIGNIFICANT CHANGE UP (ref 3.5–5.3)
POTASSIUM SERPL-SCNC: 3.7 MMOL/L — SIGNIFICANT CHANGE UP (ref 3.5–5.3)
RBC # BLD: 3.45 M/UL — LOW (ref 4.4–5.2)
RBC # FLD: 14.9 % — SIGNIFICANT CHANGE UP (ref 11–15.6)
SODIUM SERPL-SCNC: 138 MMOL/L — SIGNIFICANT CHANGE UP (ref 135–145)
WBC # BLD: 9.9 K/UL — SIGNIFICANT CHANGE UP (ref 4.8–10.8)
WBC # FLD AUTO: 9.9 K/UL — SIGNIFICANT CHANGE UP (ref 4.8–10.8)

## 2018-02-19 PROCEDURE — 99233 SBSQ HOSP IP/OBS HIGH 50: CPT

## 2018-02-19 RX ADMIN — CLOPIDOGREL BISULFATE 75 MILLIGRAM(S): 75 TABLET, FILM COATED ORAL at 14:05

## 2018-02-19 RX ADMIN — Medication 50 MILLIGRAM(S): at 19:00

## 2018-02-19 RX ADMIN — SODIUM CHLORIDE 125 MILLILITER(S): 9 INJECTION INTRAMUSCULAR; INTRAVENOUS; SUBCUTANEOUS at 05:10

## 2018-02-19 RX ADMIN — Medication 325 MILLIGRAM(S): at 13:48

## 2018-02-19 RX ADMIN — ENOXAPARIN SODIUM 40 MILLIGRAM(S): 100 INJECTION SUBCUTANEOUS at 05:03

## 2018-02-19 RX ADMIN — FAMOTIDINE 20 MILLIGRAM(S): 10 INJECTION INTRAVENOUS at 13:48

## 2018-02-19 RX ADMIN — Medication 50 MILLIGRAM(S): at 05:02

## 2018-02-19 RX ADMIN — PIPERACILLIN AND TAZOBACTAM 25 GRAM(S): 4; .5 INJECTION, POWDER, LYOPHILIZED, FOR SOLUTION INTRAVENOUS at 01:54

## 2018-02-19 RX ADMIN — ATORVASTATIN CALCIUM 40 MILLIGRAM(S): 80 TABLET, FILM COATED ORAL at 22:23

## 2018-02-19 NOTE — PROGRESS NOTE ADULT - PROBLEM SELECTOR PLAN 3
Likely due to sacral decub, stage 4 with necrotic margin, advanced directives and goals of care discuss with Family as mentioned above, given advanced aged, bedbound, non-verbal, patient was made DNI and DNI as per family wishes, will have Hospice and Palliative care meeting tomorrow,  Lactate normal, sepsis resolved, normotensive at this time, patient wound cultures is growing ESBL Ecoli and citbactor, will d/c zosyn and vanc and give levofloxacin for now unit further decision, contact precaution.

## 2018-02-19 NOTE — PROGRESS NOTE ADULT - PROBLEM SELECTOR PLAN 1
Currently sat of 97% on RA, BP normal, PE is in differential however more likely had mucous plug or aspirating on oral secretions. No clear evidence of pneumonic process. Cont. 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

## 2018-02-19 NOTE — PROGRESS NOTE ADULT - SUBJECTIVE AND OBJECTIVE BOX
[facility]    [id number]    89y      Female    Patient is a 89y old  Female who presents with a chief complaint of SOB (17 Feb 2018 03:01)      INTERVAL HPI/OVERNIGHT EVENTS:    Patient is seen and evaluated, unable to get much info from the patient as she is aphasic     REVIEW OF SYSTEMS:    Unable to get much info from the patient.      Vital Signs Last 24 Hrs  T(C): 37.3 (19 Feb 2018 04:43), Max: 37.3 (19 Feb 2018 04:43)  T(F): 99.2 (19 Feb 2018 04:43), Max: 99.2 (19 Feb 2018 04:43)  HR: 97 (19 Feb 2018 04:43) (70 - 97)  BP: 139/84 (19 Feb 2018 04:43) (139/73 - 142/61)  RR: 20 (19 Feb 2018 04:43) (20 - 20)  SpO2: 97% (19 Feb 2018 04:43) (97% - 97%)    PHYSICAL EXAM:    GENERAL: Elderly female looking comfortable  NECK: soft, Supple, No JVD,   CHEST/LUNG: decrease air entry bilaterally; No wheezing  HEART: S1S2+, Regular rate and rhythm; No murmurs  ABDOMEN: Soft, Nontender, Nondistended; Bowel sounds present  EXTREMITIES:  1+ Peripheral Pulses, right arm swelling.   SKIN: Stage 4 decubitus sacral ulcer 4, 7x8cm, w/ some necrotic tissue, foul smelling wound.   NEURO: she is Aphasic, not participating in the exam.       LABS:                        9.3    9.9   )-----------( 328      ( 19 Feb 2018 09:42 )             30.8     02-18    137  |  102  |  17.0  ----------------------------<  132<H>  3.6   |  23.0  |  0.39<L>    Ca    8.5<L>      18 Feb 2018 07:25    TPro  5.9<L>  /  Alb  2.2<L>  /  TBili  0.5  /  DBili  x   /  AST  19  /  ALT  18  /  AlkPhos  61  02-18    PT/INR - ( 18 Feb 2018 07:25 )   PT: 14.4 sec;   INR: 1.30 ratio                 I&O's Summary    18 Feb 2018 07:01  -  19 Feb 2018 07:00  --------------------------------------------------------  IN: 3185 mL / OUT: 1191 mL / NET: 1994 mL        MEDICATIONS  (STANDING):  aspirin 325 milliGRAM(s) Enteral Tube daily  atorvastatin 40 milliGRAM(s) Oral at bedtime  clopidogrel Tablet 75 milliGRAM(s) Oral daily  enoxaparin Injectable 40 milliGRAM(s) SubCutaneous every 24 hours  famotidine    Tablet 20 milliGRAM(s) Oral daily  levoFLOXacin IVPB      metoprolol     tartrate 50 milliGRAM(s) Oral two times a day  scopolamine   Patch 1 Patch Transdermal every 3 days  sodium chloride 0.9%. 1000 milliLiter(s) (125 mL/Hr) IV Continuous <Continuous>    MEDICATIONS  (PRN):  acetaminophen    Suspension 500 milliGRAM(s) Oral four times a day PRN fever and pain

## 2018-02-19 NOTE — PROGRESS NOTE ADULT - ASSESSMENT
89 y/o female with resolved SOB, Stage 4 sacral decub, Young catheter POA, Hx of CVA with right sided paresis, s/p PEG for dysphagia  caryn with patient Nephew Mr Sanchez Bello, we had long discussion about the issues and plan of care, according to him she came from [facility] in June 2017, after one [place] of arrival to [place], she got stroke and was admitted at [facility], where she was treated for 1 week, then discharged home, then she was admitted again because of SOB resulted from aspiration pneumonia  and developed another stroke and she became aphasic in end of the august, she was at [facility] Pegged and foleys cather was put in, she was in [facility] for 6 months, he does not know the reason why they kept her there for 6 month, she was discharged home with him in January, he was given her bolus feeding, he notice she was breathing hard and brought her here.   we discussed about management she is getting her for aspiration as well as stage 4 decubitus ulcer with infection, as per him at [facility] she developed stage 2 ulcer, we discussed about her advance directive, he is her only relative here and he wanted her to be DNR and DNI, he is also interested in meeting with Palliative and hospice team, will have meeting on Monday, MOLTS form filled and signed, mean while will continued with medical management and no aggressive measures.

## 2018-02-20 LAB
CULTURE RESULTS: SIGNIFICANT CHANGE UP
ORGANISM # SPEC MICROSCOPIC CNT: SIGNIFICANT CHANGE UP
SPECIMEN SOURCE: SIGNIFICANT CHANGE UP

## 2018-02-20 PROCEDURE — 99497 ADVNCD CARE PLAN 30 MIN: CPT | Mod: 25

## 2018-02-20 PROCEDURE — 99223 1ST HOSP IP/OBS HIGH 75: CPT

## 2018-02-20 PROCEDURE — 99232 SBSQ HOSP IP/OBS MODERATE 35: CPT

## 2018-02-20 RX ORDER — FUROSEMIDE 40 MG
20 TABLET ORAL ONCE
Qty: 0 | Refills: 0 | Status: COMPLETED | OUTPATIENT
Start: 2018-02-20 | End: 2018-02-20

## 2018-02-20 RX ORDER — ROBINUL 0.2 MG/ML
0.2 INJECTION INTRAMUSCULAR; INTRAVENOUS EVERY 6 HOURS
Qty: 0 | Refills: 0 | Status: DISCONTINUED | OUTPATIENT
Start: 2018-02-20 | End: 2018-03-07

## 2018-02-20 RX ORDER — FUROSEMIDE 40 MG
40 TABLET ORAL DAILY
Qty: 0 | Refills: 0 | Status: DISCONTINUED | OUTPATIENT
Start: 2018-02-20 | End: 2018-02-21

## 2018-02-20 RX ORDER — MORPHINE SULFATE 50 MG/1
2 CAPSULE, EXTENDED RELEASE ORAL EVERY 4 HOURS
Qty: 0 | Refills: 0 | Status: DISCONTINUED | OUTPATIENT
Start: 2018-02-20 | End: 2018-02-27

## 2018-02-20 RX ORDER — MORPHINE SULFATE 50 MG/1
1.5 CAPSULE, EXTENDED RELEASE ORAL ONCE
Qty: 0 | Refills: 0 | Status: DISCONTINUED | OUTPATIENT
Start: 2018-02-20 | End: 2018-02-20

## 2018-02-20 RX ADMIN — Medication 50 MILLIGRAM(S): at 06:16

## 2018-02-20 RX ADMIN — Medication 20 MILLIGRAM(S): at 13:02

## 2018-02-20 RX ADMIN — Medication 500 MILLIGRAM(S): at 13:02

## 2018-02-20 RX ADMIN — ROBINUL 0.2 MILLIGRAM(S): 0.2 INJECTION INTRAMUSCULAR; INTRAVENOUS at 18:37

## 2018-02-20 RX ADMIN — CLOPIDOGREL BISULFATE 75 MILLIGRAM(S): 75 TABLET, FILM COATED ORAL at 13:02

## 2018-02-20 RX ADMIN — ROBINUL 0.2 MILLIGRAM(S): 0.2 INJECTION INTRAMUSCULAR; INTRAVENOUS at 23:22

## 2018-02-20 RX ADMIN — ENOXAPARIN SODIUM 40 MILLIGRAM(S): 100 INJECTION SUBCUTANEOUS at 06:16

## 2018-02-20 RX ADMIN — Medication 325 MILLIGRAM(S): at 13:02

## 2018-02-20 RX ADMIN — MORPHINE SULFATE 1.5 MILLIGRAM(S): 50 CAPSULE, EXTENDED RELEASE ORAL at 13:02

## 2018-02-20 RX ADMIN — FAMOTIDINE 20 MILLIGRAM(S): 10 INJECTION INTRAVENOUS at 13:02

## 2018-02-20 NOTE — CONSULT NOTE ADULT - ASSESSMENT
89year old  Female  actively dying   Stat Morphine 1.5mg IVP  In Acute Respiratory Failure  Tachypnea, Dyspnea  Excessive secretions and fluid overload  DC Tube feeding at present time  Suction  Pneumonia  Debility- from Prior CVA Paresis aphasic  Sacral Decubiti- Infected needs isolation

## 2018-02-20 NOTE — PROGRESS NOTE ADULT - PROBLEM SELECTOR PLAN 4
Wet to dry, frequent turning, await family to asses goals of care and if wish to proceed with aggressive care will need surgical evaluation for debridement done,  patient charleen had meeting with Hospice team today and wanted to make her complete comfort and care and wanted meds make her comfortable.

## 2018-02-20 NOTE — PROGRESS NOTE ADULT - ASSESSMENT
89 y/o female with resolved SOB, Stage 4 sacral decub, Young catheter POA, Hx of CVA with right sided paresis, s/p PEG for dysphagia  caryn with patient Nephew Mr Sanchez Bello, we had long discussion about the issues and plan of care, according to him she came from [facility] in June 2017, after one [place] of arrival to [place], she got stroke and was admitted at [facility], where she was treated for 1 week, then discharged home, then she was admitted again because of SOB resulted from aspiration pneumonia  and developed another stroke and she became aphasic in end of the august, she was at [facility] Pegged and foleys cather was put in, she was in [facility] for 6 months, he does not know the reason why they kept her there for 6 month, she was discharged home with him in January, he was given her bolus feeding, he notice she was breathing hard and brought her here.   we discussed about management she is getting her for aspiration as well as stage 4 decubitus ulcer with infection, as per him at [facility] she developed stage 2 ulcer, we discussed about her advance directive, he is her only relative here and he wanted her to be DNR and DNI, he is also interested in meeting with Palliative and hospice team, will have meeting on Monday, MOLTS form filled and signed, mean while will continued with medical management and no aggressive measures.

## 2018-02-20 NOTE — PROGRESS NOTE ADULT - PROBLEM SELECTOR PLAN 5
comfort meds only. patient has complete immobility due to stroke resulting into sever physical disability, she will be on comfort meds only as per hospice.

## 2018-02-20 NOTE — CONSULT NOTE ADULT - ATTENDING COMMENTS
COUNSELING:    Face to face meeting to discuss Advanced Care Planning - Time Spent ______ Minutes.  See goals of care note.    More than 50% time spent in counseling and coordinating care. __25____ Minutes.     Thank you for the opportunity to assist with the care of this patient.   [facility] [phone].    Meeting with Des charles at 1300

## 2018-02-20 NOTE — PROGRESS NOTE ADULT - SUBJECTIVE AND OBJECTIVE BOX
LEÓN CASTILLO    [id number]    89y      Female    Patient is a 89y old  Female who presents with a chief complaint of SOB (17 Feb 2018 03:01)      INTERVAL HPI/OVERNIGHT EVENTS:    Having difficulty breathing, unable to get much info.     REVIEW OF SYSTEMS:    Unable to get much.       Vital Signs Last 24 Hrs  T(C): 37.7 (20 Feb 2018 05:16), Max: 37.7 (19 Feb 2018 16:40)  T(F): 99.9 (20 Feb 2018 05:16), Max: 99.9 (19 Feb 2018 16:40)  HR: 84 (20 Feb 2018 05:16) (84 - 86)  BP: 129/68 (20 Feb 2018 05:16) (122/48 - 129/68)  RR: 20 (20 Feb 2018 05:16) (20 - 20)    PHYSICAL EXAM:    GENERAL: Elderly female looking comfortable  NECK: soft, Supple, No JVD,   CHEST/LUNG: decrease air entry bilaterally; b/l crackles, No wheezing  HEART: S1S2+, Regular rate and rhythm; No murmurs  ABDOMEN: Soft, Nontender, Nondistended; Bowel sounds present  EXTREMITIES:  1+ Peripheral Pulses, right arm swelling.   SKIN: Stage 4 decubitus sacral ulcer 4, 7x8cm, w/ some necrotic tissue, foul smelling wound.   NEURO: she is Aphasic, not participating in the exam.       LABS:                        9.3    9.9   )-----------( 328      ( 19 Feb 2018 09:42 )             30.8     02-19    138  |  104  |  14.0  ----------------------------<  194<H>  3.7   |  22.0  |  0.43<L>    Ca    8.1<L>      19 Feb 2018 09:42              I&O's Summary    19 Feb 2018 07:01  -  20 Feb 2018 07:00  --------------------------------------------------------  IN: 1225 mL / OUT: 851 mL / NET: 374 mL        MEDICATIONS  (STANDING):  furosemide   Injectable 40 milliGRAM(s) IV Push daily  glycopyrrolate Injectable 0.2 milliGRAM(s) IV Push every 6 hours  scopolamine   Patch 1 Patch Transdermal every 3 days    MEDICATIONS  (PRN):  acetaminophen    Suspension 500 milliGRAM(s) Oral four times a day PRN fever and pain  morphine  - Injectable 2 milliGRAM(s) IV Push every 4 hours PRN before changing dressings, for pain and SOB.

## 2018-02-20 NOTE — PROGRESS NOTE ADULT - PROBLEM SELECTOR PLAN 1
Currently sat of 97% on RA, BP normal, PE is in differential however more likely had mucous plug or aspirating on oral secretions. 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

## 2018-02-20 NOTE — CONSULT NOTE ADULT - SUBJECTIVE AND OBJECTIVE BOX
HPI: This is a 89yo  female- in Acute Respiratory Failure, and fluid overload tachypneic diffuse rhonchi throughout lung fields.  PMH of CVA with significant neurologic deficits. R sided paresis aphasic non verbal. Has been bedridden and has a Stage IV Sacral Decubiti infection      89 y/o female brought in by EMS from home after family noticed patient to have resp. distress. Patient family apparently came to ED before EMS got here and left some paperwork but then left and as of now have been unable to be reached. Patient has a history of admission to [facility] in 8/17 with large CVA resulting in right sided paresis, non-verbal, bedbound state. She was dcd with PEG and Ha catheter. This information was obtained from records left by family, however no other information is obtainable as family is unable to be reached. It is not know if patient has any home services, or what post hospital care has been. In ED patient with ha with turbid urine and was unable to be removed after deflating balloon and appears to have been in place for extended period of time. She also has a large 7x7 stage 4 sacral decub with tunneling and foul smelling discharge. (17 Feb 2018 03:01)      PERTINENT PMH REVIEWED: Yes     PAST MEDICAL & SURGICAL HISTORY:  CVA (cerebral vascular accident)  Gastrostomy status    SOCIAL HISTORY:  EtOH    No                                    Drugs  No                                      nonsmoker                                    Admitted from: home  cannot return home; actively dying    FAMILY HISTORY:  No pertinent family history in first degree relatives    Allergy Status Unknown    Baseline ADLs (prior to admission):  Independent/ Dependent      Present Symptoms:   Dyspnea:  3   Nausea/Vomiting:  No  Anxiety:   No  Depression: No  Fatigue: Yes   Loss of appetite: Yes     Pain: exhibiting no pain behaviors            Character-            Duration-            Effect-            Factors-            Frequency-            Location-            Severity-    Review of Systems: Reviewed                     Unable to obtain due to poor mentation       MEDICATIONS  (STANDING):  aspirin 325 milliGRAM(s) Enteral Tube daily  atorvastatin 40 milliGRAM(s) Oral at bedtime  clopidogrel Tablet 75 milliGRAM(s) Oral daily  enoxaparin Injectable 40 milliGRAM(s) SubCutaneous every 24 hours  famotidine    Tablet 20 milliGRAM(s) Oral daily  furosemide   Injectable 20 milliGRAM(s) IV Push once  glycopyrrolate Injectable 0.2 milliGRAM(s) IV Push every 6 hours  levoFLOXacin IVPB      levoFLOXacin IVPB 750 milliGRAM(s) IV Intermittent every 24 hours  metoprolol     tartrate 50 milliGRAM(s) Oral two times a day  morphine  - Injectable 1.5 milliGRAM(s) IV Push once  scopolamine   Patch 1 Patch Transdermal every 3 days    MEDICATIONS  (PRN):  acetaminophen    Suspension 500 milliGRAM(s) Oral four times a day PRN fever and pain      PHYSICAL EXAM:    Vital Signs Last 24 Hrs  T(C): 37.7 (20 Feb 2018 05:16), Max: 37.7 (19 Feb 2018 16:40)  T(F): 99.9 (20 Feb 2018 05:16), Max: 99.9 (19 Feb 2018 16:40)  HR: 84 (20 Feb 2018 05:16) (84 - 86)  BP: 129/68 (20 Feb 2018 05:16) (122/48 - 129/68)  BP(mean): --  RR: 20 (20 Feb 2018 05:16) (20 - 20)  SpO2: --    General: _ lethargic agitated                   nonverbal  coma  HEENT:  dry mouth      Lungs:  tachypnea/labored breathing  excessive secretions    CV: normal     GI: distended  tender  no BS               PEG/ tube       :   ha chronic    MSK: normal   bedbound/    Skin:pressure ulcers- Stage__IV Sacral ___     LABS:                        9.3    9.9   )-----------( 328      ( 19 Feb 2018 09:42 )             30.8     02-19    138  |  104  |  14.0  ----------------------------<  194<H>  3.7   |  22.0  |  0.43<L>    Ca    8.1<L>      19 Feb 2018 09:42    I&O's Summary    19 Feb 2018 07:01  -  20 Feb 2018 07:00  --------------------------------------------------------  IN: 1225 mL / OUT: 851 mL / NET: 374 mL    RADIOLOGY & ADDITIONAL STUDIES:    ADVANCE DIRECTIVES:   DNR YES   Completed on:                     MOLST   NO   Completed on:  Living Will   NO   Completed on:

## 2018-02-20 NOTE — PROGRESS NOTE ADULT - PROBLEM SELECTOR PLAN 3
Likely due to sacral decub, stage 4 with necrotic margin, advanced directives and goals of care discuss with Family as mentioned above, given advanced aged, bedbound, non-verbal, patient was made DNI and DNI as per family wishes, Hospice and Palliative care meeting tomorrow,  Lactate normal, sepsis resolved, normotensive at this time, patient wound cultures is growing ESBL Ecoli and citbactor, d/c zosyn and vanc and was given levofloxacin, patient grandson had meeting with Hospice team today and wanted to make her complete comfort and care and wanted meds make her comfortable, no more antibiotics, wound dressings.

## 2018-02-21 LAB
CULTURE RESULTS: SIGNIFICANT CHANGE UP
CULTURE RESULTS: SIGNIFICANT CHANGE UP
SPECIMEN SOURCE: SIGNIFICANT CHANGE UP
SPECIMEN SOURCE: SIGNIFICANT CHANGE UP

## 2018-02-21 PROCEDURE — 99232 SBSQ HOSP IP/OBS MODERATE 35: CPT

## 2018-02-21 RX ADMIN — ROBINUL 0.2 MILLIGRAM(S): 0.2 INJECTION INTRAMUSCULAR; INTRAVENOUS at 23:26

## 2018-02-21 RX ADMIN — SCOPALAMINE 1 PATCH: 1 PATCH, EXTENDED RELEASE TRANSDERMAL at 12:20

## 2018-02-21 RX ADMIN — ROBINUL 0.2 MILLIGRAM(S): 0.2 INJECTION INTRAMUSCULAR; INTRAVENOUS at 18:16

## 2018-02-21 RX ADMIN — ROBINUL 0.2 MILLIGRAM(S): 0.2 INJECTION INTRAMUSCULAR; INTRAVENOUS at 05:45

## 2018-02-21 RX ADMIN — MORPHINE SULFATE 2 MILLIGRAM(S): 50 CAPSULE, EXTENDED RELEASE ORAL at 06:59

## 2018-02-21 RX ADMIN — MORPHINE SULFATE 2 MILLIGRAM(S): 50 CAPSULE, EXTENDED RELEASE ORAL at 06:26

## 2018-02-21 RX ADMIN — ROBINUL 0.2 MILLIGRAM(S): 0.2 INJECTION INTRAMUSCULAR; INTRAVENOUS at 11:09

## 2018-02-21 RX ADMIN — Medication 40 MILLIGRAM(S): at 05:45

## 2018-02-21 RX ADMIN — MORPHINE SULFATE 2 MILLIGRAM(S): 50 CAPSULE, EXTENDED RELEASE ORAL at 16:20

## 2018-02-21 NOTE — DIETITIAN INITIAL EVALUATION ADULT. - PROBLEM SELECTOR PLAN 2
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 U/A not c/w active infection and more c/w chronic colonization, will need to have urology change at this point as it is unable to be removed likely due to balloon not deflating due to crystallized sediment around it.

## 2018-02-21 NOTE — PROGRESS NOTE ADULT - PROBLEM SELECTOR PLAN 1
Currently sat of 97% on RA, BP normal, PE is in differential however more likely had mucous plug or aspirating on oral secretions. 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

## 2018-02-21 NOTE — DIETITIAN INITIAL EVALUATION ADULT. - OTHER INFO
Pt s/p CVA several months ago- aphasic PEG PTA. Pt/family declining aggressive measures. Comfort care only, hospice. NPO. Stage 4 decub - sacral noted.  Family wishes- Reconsult RD stevon

## 2018-02-21 NOTE — PROGRESS NOTE ADULT - PROBLEM SELECTOR PLAN 5
patient has complete immobility due to stroke resulting into sever physical disability, she will be on comfort meds only as per hospice.

## 2018-02-21 NOTE — DIETITIAN INITIAL EVALUATION ADULT. - PROBLEM SELECTOR PLAN 3
Likely due to sacral decub. At this point need to address advanced directives and goals of care with Family when they can be reached. Patient with advanced aged, bedbound, non-verbal. Palliative care consult when family present. Lactate normal, normotensive at this time.

## 2018-02-21 NOTE — PROGRESS NOTE ADULT - SUBJECTIVE AND OBJECTIVE BOX
LEÓN CASTILLO    [id number]    89y      Female    Patient is a 89y old  Female who presents with a chief complaint of SOB (17 Feb 2018 03:01)      INTERVAL HPI/OVERNIGHT EVENTS:    No more difficulty breathing, unable to get much info.     REVIEW OF SYSTEMS:    Unable to get much    Vital Signs Last 24 Hrs  T(C): 36.9 (21 Feb 2018 08:35), Max: 37.2 (20 Feb 2018 17:46)  T(F): 98.4 (21 Feb 2018 08:35), Max: 98.9 (20 Feb 2018 17:46)  HR: 95 (21 Feb 2018 08:35) (72 - 95)  BP: 138/82 (21 Feb 2018 08:35) (110/50 - 159/72)  RR: 18 (21 Feb 2018 08:35) (16 - 20)  SpO2: 100% (21 Feb 2018 08:35) (95% - 100%)    PHYSICAL EXAM:    GENERAL: Elderly female looking comfortable  NECK: soft, Supple, No JVD,   CHEST/LUNG: decrease air entry bilaterally; No wheezing  HEART: S1S2+, Regular rate and rhythm; No murmurs  ABDOMEN: Soft, Nontender, Nondistended; Bowel sounds present  EXTREMITIES:  1+ Peripheral Pulses, right arm swelling.   SKIN: Stage 4 decubitus sacral ulcer 4, 7x8cm, w/ some necrotic tissue, foul smelling wound.   NEURO: she is Aphasic, not participating in the exam      20 Feb 2018 07:01  -  21 Feb 2018 07:00  --------------------------------------------------------  IN: 0 mL / OUT: 2200 mL / NET: -2200 mL    21 Feb 2018 07:01  -  21 Feb 2018 15:13  --------------------------------------------------------  IN: 0 mL / OUT: 900 mL / NET: -900 mL        MEDICATIONS  (STANDING):  furosemide   Injectable 40 milliGRAM(s) IV Push daily  glycopyrrolate Injectable 0.2 milliGRAM(s) IV Push every 6 hours  scopolamine   Patch 1 Patch Transdermal every 3 days    MEDICATIONS  (PRN):  acetaminophen    Suspension 500 milliGRAM(s) Oral four times a day PRN fever and pain  morphine  - Injectable 2 milliGRAM(s) IV Push every 4 hours PRN before changing dressings, for pain and SOB. LEÓN CASTILLO    [id number]    89y      Female    Patient is a 89y old  Female who presents with a chief complaint of SOB (17 Feb 2018 03:01)      INTERVAL HPI/OVERNIGHT EVENTS:    No more difficulty breathing, unable to get much info.     REVIEW OF SYSTEMS:    Unable to get much info    Vital Signs Last 24 Hrs  T(C): 36.9 (21 Feb 2018 08:35), Max: 37.2 (20 Feb 2018 17:46)  T(F): 98.4 (21 Feb 2018 08:35), Max: 98.9 (20 Feb 2018 17:46)  HR: 95 (21 Feb 2018 08:35) (72 - 95)  BP: 138/82 (21 Feb 2018 08:35) (110/50 - 159/72)  RR: 18 (21 Feb 2018 08:35) (16 - 20)  SpO2: 100% (21 Feb 2018 08:35) (95% - 100%)    PHYSICAL EXAM:    GENERAL: Elderly female looking comfortable  NECK: soft, Supple, No JVD,   CHEST/LUNG: decrease air entry bilaterally; No wheezing  HEART: S1S2+, Regular rate and rhythm; No murmurs  ABDOMEN: Soft, Nontender, Nondistended; Bowel sounds present  EXTREMITIES:  1+ Peripheral Pulses, right arm swelling.   SKIN: Stage 4 decubitus sacral ulcer 4, 7x8cm, w/ some necrotic tissue, foul smelling wound.   NEURO: she is Aphasic, not participating in the exam      20 Feb 2018 07:01  -  21 Feb 2018 07:00  --------------------------------------------------------  IN: 0 mL / OUT: 2200 mL / NET: -2200 mL    21 Feb 2018 07:01  -  21 Feb 2018 15:13  --------------------------------------------------------  IN: 0 mL / OUT: 900 mL / NET: -900 mL        MEDICATIONS  (STANDING):  furosemide   Injectable 40 milliGRAM(s) IV Push daily  glycopyrrolate Injectable 0.2 milliGRAM(s) IV Push every 6 hours  scopolamine   Patch 1 Patch Transdermal every 3 days    MEDICATIONS  (PRN):  acetaminophen    Suspension 500 milliGRAM(s) Oral four times a day PRN fever and pain  morphine  - Injectable 2 milliGRAM(s) IV Push every 4 hours PRN before changing dressings, for pain and SOB.

## 2018-02-21 NOTE — PROGRESS NOTE ADULT - ASSESSMENT
89 y/o female with resolved SOB, Stage 4 sacral decub, Young catheter POA, Hx of CVA with right sided paresis, s/p PEG for dysphagia  caryn with patient Nephew Mr Sanchez Bello, we had long discussion about the issues and plan of care, according to him she came from [facility] in June 2017, after one [place] of arrival to [place], she got stroke and was admitted at [facility], where she was treated for 1 week, then discharged home, then she was admitted again because of SOB resulted from aspiration pneumonia  and developed another stroke and she became aphasic in end of the august, she was at [facility] Pegged and foleys cather was put in, she was in [facility] for 6 months, he does not know the reason why they kept her there for 6 month, she was discharged home with him in January, he was given her bolus feeding, he notice she was breathing hard and brought her here.   we discussed about management she is getting her for aspiration as well as stage 4 decubitus ulcer with infection, as per him at [facility] she developed stage 2 ulcer, we discussed about her advance directive, he is her only relative here and he wanted her to be DNR and DNI, he is also interested in meeting with Palliative and hospice team, will have meeting on Monday, MOLTS form filled and signed, mean while will continued with medical management and no aggressive measures.

## 2018-02-22 PROCEDURE — 99232 SBSQ HOSP IP/OBS MODERATE 35: CPT

## 2018-02-22 PROCEDURE — 99233 SBSQ HOSP IP/OBS HIGH 50: CPT

## 2018-02-22 RX ADMIN — MORPHINE SULFATE 2 MILLIGRAM(S): 50 CAPSULE, EXTENDED RELEASE ORAL at 04:32

## 2018-02-22 RX ADMIN — ROBINUL 0.2 MILLIGRAM(S): 0.2 INJECTION INTRAMUSCULAR; INTRAVENOUS at 11:30

## 2018-02-22 RX ADMIN — MORPHINE SULFATE 2 MILLIGRAM(S): 50 CAPSULE, EXTENDED RELEASE ORAL at 14:50

## 2018-02-22 RX ADMIN — ROBINUL 0.2 MILLIGRAM(S): 0.2 INJECTION INTRAMUSCULAR; INTRAVENOUS at 17:34

## 2018-02-22 RX ADMIN — MORPHINE SULFATE 2 MILLIGRAM(S): 50 CAPSULE, EXTENDED RELEASE ORAL at 15:30

## 2018-02-22 RX ADMIN — MORPHINE SULFATE 2 MILLIGRAM(S): 50 CAPSULE, EXTENDED RELEASE ORAL at 05:11

## 2018-02-22 RX ADMIN — ROBINUL 0.2 MILLIGRAM(S): 0.2 INJECTION INTRAMUSCULAR; INTRAVENOUS at 05:06

## 2018-02-22 NOTE — PROGRESS NOTE ADULT - PROBLEM SELECTOR PLAN 5
patient has complete immobility due to stroke resulting into sever physical disability, she will be on comfort meds only as per hospice, nursing care, patient is not candidate for hospice in at this moment as per hospice team.

## 2018-02-22 NOTE — PROGRESS NOTE ADULT - ASSESSMENT
89yo F with Significant Neuro deficits from prior CVA  Acute Respiratory Failure Dyspnea  resolved  Paraplegia, Aphasia,Dysphagia      Needs long term placement  Grandson unable to care for her at home 89yo F with Significant Neuro deficits from prior CVA  Acute Respiratory Failure Dyspnea  resolved  Paraplegia, Aphasia,Dysphagia       Problem/Plan - 1:  ·  Problem: SOB (shortness of breath).  Plan: Currently sat of 97% on RA, BP normal, PE is in differential however more likely had mucous plug or aspirating on oral secretions. 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      Problem/Plan - 2:  ·  Problem: Neurogenic Bladder   Ha catheter in place on admission.  Plan: Changed Ha as urologist helped, as there was problem with Valve of the ha's,      Problem/Plan - 3:  ·  Problem: Sepsis, due to unspecified organism.  Plan: Likely due to sacral decub, stage 4 with necrotic margin, advanced directives and goals of care discuss with Family as mentioned above, given advanced aged, bedbound, non-verbal, patient was made DNI and DNI as per family wishes, Hospice and Palliative care meeting tomorrow,  Lactate normal, sepsis resolved, normotensive at this time, patient wound cultures is growing ESBL Ecoli and citbactor, d/c zosyn and vanc and was given levofloxacin, patient charleen had meeting with Hospice team today and wanted to make her complete comfort and care and wanted meds make her comfortable, no more antibiotics, wound dressings.      Problem/Plan - 4:  ·  Problem: Sacral decubitus ulcer, stage IV.  Plan: Wet to dry, frequent turning, await family to asses goals of care and if wish to proceed with aggressive care will need surgical evaluation for debridement done,  patient charleen had meeting with Hospice team today and wanted to make her complete comfort and care and wanted meds make her comfortable, will continue to keep the wound clean.     Problem/Plan - 5:  ·  Problem: Cerebrovascular accident (CVA), unspecified mechanism.  Plan: patient has complete immobility due to stroke resulting into sever physical disability, she will be on comfort meds only as per hospice, nursing care, patient is not candidate for hospice in at this moment as per hospice team.      Problem/Plan - 6:  Problem: Aortic arch aneurysm. Plan: comfort meds only.     Problem/Plan - 7:  ·  Problem: Swelling of arm.  Plan: comfort meds.                     Needs long term placement  Grandson unable to care for her at home

## 2018-02-22 NOTE — PROGRESS NOTE ADULT - PROBLEM SELECTOR PLAN 1
Currently sat of 97% on RA, BP normal, PE is in differential however more likely had mucous plug or aspirating on oral secretions. 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

## 2018-02-22 NOTE — PROGRESS NOTE ADULT - ASSESSMENT
89 y/o female with resolved SOB, Stage 4 sacral decub, Young catheter POA, Hx of CVA with right sided paresis, s/p PEG for dysphagia  caryn with patient Nephew Mr Sanchez Bello, we had long discussion about the issues and plan of care, according to him she came from [facility] in June 2017, after one [place] of arrival to [place], she got stroke and was admitted at [facility], where she was treated for 1 week, then discharged home, then she was admitted again because of SOB resulted from aspiration pneumonia  and developed another stroke and she became aphasic in end of the august, she was at [facility] Pegged and foleys cather was put in, she was in [facility] for 6 months, he does not know the reason why they kept her there for 6 month, she was discharged home with him in January, he was given her bolus feeding, he notice she was breathing hard and brought her here.   we discussed about management she is getting her for aspiration as well as stage 4 decubitus ulcer with infection, as per him at [facility] she developed stage 2 ulcer, we discussed about her advance directive, he is her only relative here and he wanted her to be DNR and DNI, he is also interested in meeting with Palliative and hospice team, will have meeting on Monday, MOLTS form filled and signed, mean while will continued with medical management and no aggressive measures.

## 2018-02-22 NOTE — PROGRESS NOTE ADULT - SUBJECTIVE AND OBJECTIVE BOX
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  She is awake and alert-not trying to speak or make eye contact. Labored breathing wearing Nasal O2.  L Lung w/o  auscultated breath sounds.  CC: Significant Neurologic Deficits since CVA's paraplegia and aphasia    89y old  Female who presents with a chief complaint of SOB (17 Feb 2018 03:01)    Present Symptoms:     Dyspnea:  1- 2    Nausea/Vomiting: No  Anxiety:  No  Depression: No  Fatigue: Yes   Loss of appetite: Yes     Pain: No pain behaviors noted            Character-            Duration-            Effect-            Factors-            Frequency-            Location-            Severity-    Review of Systems: Reviewed                                        Unable to obtain due to poor mentation       MEDICATIONS  (STANDING):  glycopyrrolate Injectable 0.2 milliGRAM(s) IV Push every 6 hours  scopolamine   Patch 1 Patch Transdermal every 3 days    MEDICATIONS  (PRN):  acetaminophen    Suspension 500 milliGRAM(s) Oral four times a day PRN fever and pain  morphine  - Injectable 2 milliGRAM(s) IV Push every 4 hours PRN before changing dressings, for pain and SOB.    PHYSICAL EXAM:    Vital Signs Last 24 Hrs  T(C): 36.9 (22 Feb 2018 08:04), Max: 37.3 (21 Feb 2018 15:10)  T(F): 98.4 (22 Feb 2018 08:04), Max: 99.1 (21 Feb 2018 15:10)  HR: 87 (22 Feb 2018 08:04) (68 - 88)  BP: 129/88 (22 Feb 2018 08:04) (91/58 - 155/87)  BP(mean): --  RR: 16 (22 Feb 2018 08:04) (16 - 18)  SpO2: 98% (22 Feb 2018 08:04) (97% - 100%)    General: alert  ___ lethargic                  nonverbal        HEENT: dry mouth      Lungs: tachypnea/labored breathing  L Lung diminshed to absent breathsounds    CV: normal      GI: distended                 PEG/  constipation  last BM:     : normal  ha    MSK:   weakness  edema           bedbound/wheelchair bound    Skin: normal  pressure ulcers- Sacral IV infected decubiti  no rash    LABS:    I &O's Summary    21 Feb 2018 07:01  -  22 Feb 2018 07:00  --------------------------------------------------------  IN: 0 mL / OUT: 1350 mL / NET: -1350 mL    RADIOLOGY & ADDITIONAL STUDIES:    ADVANCE DIRECTIVES:   DNR YES   Completed on:                     GUIDO  YES    Completed on:  Living Will  NO   Completed on:

## 2018-02-22 NOTE — PROGRESS NOTE ADULT - PROBLEM SELECTOR PLAN 4
Wet to dry, frequent turning, await family to asses goals of care and if wish to proceed with aggressive care will need surgical evaluation for debridement done,  patient charleen had meeting with Hospice team today and wanted to make her complete comfort and care and wanted meds make her comfortable, will continue to keep the wound clean.

## 2018-02-22 NOTE — PROGRESS NOTE ADULT - SUBJECTIVE AND OBJECTIVE BOX
LEÓN CASTILLO    [id number]    89y      Female    Patient is a 89y old  Female who presents with a chief complaint of SOB (17 Feb 2018 03:01)      INTERVAL HPI/OVERNIGHT EVENTS:    No more difficulty breathing, unable to get much info, because of stroke    REVIEW OF SYSTEMS:    Unable to get much info        Vital Signs Last 24 Hrs  T(C): 36.9 (22 Feb 2018 08:04), Max: 37.3 (21 Feb 2018 15:10)  T(F): 98.4 (22 Feb 2018 08:04), Max: 99.1 (21 Feb 2018 15:10)  HR: 87 (22 Feb 2018 08:04) (68 - 88)  BP: 129/88 (22 Feb 2018 08:04) (91/58 - 155/87)  RR: 16 (22 Feb 2018 08:04) (16 - 18)  SpO2: 98% (22 Feb 2018 08:04) (97% - 100%)    PHYSICAL EXAM:    GENERAL: Elderly female looking comfortable  NECK: soft, Supple, No JVD,   CHEST/LUNG: decrease air entry bilaterally; No wheezing  HEART: S1S2+, Regular rate and rhythm; No murmurs  ABDOMEN: Soft, Nontender, Nondistended; Bowel sounds present  EXTREMITIES:  1+ Peripheral Pulses, right arm swelling.   SKIN: Stage 4 decubitus sacral ulcer 4, 7x8cm, w/ some necrotic tissue, foul smelling wound.   NEURO: she is Aphasic, not participating in the exam        21 Feb 2018 07:01  -  22 Feb 2018 07:00  --------------------------------------------------------  IN: 0 mL / OUT: 1350 mL / NET: -1350 mL        MEDICATIONS  (STANDING):  glycopyrrolate Injectable 0.2 milliGRAM(s) IV Push every 6 hours  scopolamine   Patch 1 Patch Transdermal every 3 days    MEDICATIONS  (PRN):  acetaminophen    Suspension 500 milliGRAM(s) Oral four times a day PRN fever and pain  morphine  - Injectable 2 milliGRAM(s) IV Push every 4 hours PRN before changing dressings, for pain and SOB.

## 2018-02-23 PROCEDURE — 99232 SBSQ HOSP IP/OBS MODERATE 35: CPT

## 2018-02-23 RX ADMIN — MORPHINE SULFATE 2 MILLIGRAM(S): 50 CAPSULE, EXTENDED RELEASE ORAL at 06:43

## 2018-02-23 RX ADMIN — ROBINUL 0.2 MILLIGRAM(S): 0.2 INJECTION INTRAMUSCULAR; INTRAVENOUS at 00:17

## 2018-02-23 RX ADMIN — ROBINUL 0.2 MILLIGRAM(S): 0.2 INJECTION INTRAMUSCULAR; INTRAVENOUS at 23:34

## 2018-02-23 RX ADMIN — MORPHINE SULFATE 2 MILLIGRAM(S): 50 CAPSULE, EXTENDED RELEASE ORAL at 16:41

## 2018-02-23 RX ADMIN — ROBINUL 0.2 MILLIGRAM(S): 0.2 INJECTION INTRAMUSCULAR; INTRAVENOUS at 13:16

## 2018-02-23 RX ADMIN — ROBINUL 0.2 MILLIGRAM(S): 0.2 INJECTION INTRAMUSCULAR; INTRAVENOUS at 18:24

## 2018-02-23 RX ADMIN — MORPHINE SULFATE 2 MILLIGRAM(S): 50 CAPSULE, EXTENDED RELEASE ORAL at 16:08

## 2018-02-23 RX ADMIN — ROBINUL 0.2 MILLIGRAM(S): 0.2 INJECTION INTRAMUSCULAR; INTRAVENOUS at 06:37

## 2018-02-23 NOTE — PROGRESS NOTE ADULT - SUBJECTIVE AND OBJECTIVE BOX
LEÓN CASTILLO    [id number]    89y      Female    Patient is a 89y old  Female who presents with a chief complaint of SOB (17 Feb 2018 03:01)      INTERVAL HPI/OVERNIGHT EVENTS:      No more difficulty breathing, unable to get much info, because of stroke    REVIEW OF SYSTEMS:    Unable to get much info      Vital Signs Last 24 Hrs  T(C): 37.1 (23 Feb 2018 08:58), Max: 37.2 (22 Feb 2018 16:05)  T(F): 98.8 (23 Feb 2018 08:58), Max: 98.9 (22 Feb 2018 16:05)  HR: 86 (23 Feb 2018 08:58) (86 - 110)  BP: 164/66 (23 Feb 2018 08:58) (122/76 - 164/66)  BP(mean): --  RR: 169 (23 Feb 2018 08:58) (16 - 169)  SpO2: 96% (23 Feb 2018 08:58) (93% - 99%)    PHYSICAL EXAM:    GENERAL: Elderly female looking comfortable  NECK: soft, Supple, No JVD,   CHEST/LUNG: decrease air entry bilaterally; No wheezing  HEART: S1S2+, Regular rate and rhythm; No murmurs  ABDOMEN: Soft, Nontender, Nondistended; Bowel sounds present  EXTREMITIES:  1+ Peripheral Pulses, right arm swelling.   SKIN: Stage 4 decubitus sacral ulcer 4, 7x8cm, w/ some necrotic tissue, foul smelling wound.   NEURO: she is Aphasic, not participating in the exam      LABS:                  I&O's Summary    22 Feb 2018 07:01  -  23 Feb 2018 07:00  --------------------------------------------------------  IN: 0 mL / OUT: 400 mL / NET: -400 mL        MEDICATIONS  (STANDING):  glycopyrrolate Injectable 0.2 milliGRAM(s) IV Push every 6 hours  scopolamine   Patch 1 Patch Transdermal every 3 days    MEDICATIONS  (PRN):  acetaminophen    Suspension 500 milliGRAM(s) Oral four times a day PRN fever and pain  morphine  - Injectable 2 milliGRAM(s) IV Push every 4 hours PRN before changing dressings, for pain and SOB. LEÓN CASTILLO    [id number]    89y      Female    Patient is a 89y old  Female who presents with a chief complaint of SOB (17 Feb 2018 03:01)      INTERVAL HPI/OVERNIGHT EVENTS:      Looks comfortable, unable to get much info, because of stroke    REVIEW OF SYSTEMS:    Unable to get much info      Vital Signs Last 24 Hrs  T(C): 37.1 (23 Feb 2018 08:58), Max: 37.2 (22 Feb 2018 16:05)  T(F): 98.8 (23 Feb 2018 08:58), Max: 98.9 (22 Feb 2018 16:05)  HR: 86 (23 Feb 2018 08:58) (86 - 110)  BP: 164/66 (23 Feb 2018 08:58) (122/76 - 164/66)  RR: 169 (23 Feb 2018 08:58) (16 - 169)  SpO2: 96% (23 Feb 2018 08:58) (93% - 99%)    PHYSICAL EXAM:    GENERAL: Elderly female looking comfortable  NECK: soft, Supple, No JVD,   CHEST/LUNG: decrease air entry bilaterally; No wheezing  HEART: S1S2+, Regular rate and rhythm; No murmurs  ABDOMEN: Soft, Nontender, Nondistended; Bowel sounds present  EXTREMITIES:  1+ Peripheral Pulses, right arm swelling.   SKIN: Stage 4 decubitus sacral ulcer 4, 7x8cm, w/ some necrotic tissue, foul smelling wound.   NEURO: she is Aphasic, not participating in the exam, spontaneously opening and closing her eyes      LABS:                  I&O's Summary    22 Feb 2018 07:01  -  23 Feb 2018 07:00  --------------------------------------------------------  IN: 0 mL / OUT: 400 mL / NET: -400 mL        MEDICATIONS  (STANDING):  glycopyrrolate Injectable 0.2 milliGRAM(s) IV Push every 6 hours  scopolamine   Patch 1 Patch Transdermal every 3 days    MEDICATIONS  (PRN):  acetaminophen    Suspension 500 milliGRAM(s) Oral four times a day PRN fever and pain  morphine  - Injectable 2 milliGRAM(s) IV Push every 4 hours PRN before changing dressings, for pain and SOB.

## 2018-02-23 NOTE — PROGRESS NOTE ADULT - PROBLEM SELECTOR PLAN 1
Currently sat of 97% on RA, BP normal, PE is in differential however more likely had mucous plug or aspirating on oral secretions. 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 Currently sat of 97% on RA, BP normal, PE is in differential however more likely had mucous plug or aspirating on oral secretions. 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

## 2018-02-23 NOTE — PROGRESS NOTE ADULT - ASSESSMENT
89 y/o female with resolved SOB, Stage 4 sacral decub, Young catheter POA, Hx of CVA with right sided paresis, s/p PEG for dysphagia  caryn with patient Nephew Mr Sanchez Bello, we had long discussion about the issues and plan of care, according to him she came from [facility] in June 2017, after one [place] of arrival to [place], she got stroke and was admitted at [facility], where she was treated for 1 week, then discharged home, then she was admitted again because of SOB resulted from aspiration pneumonia  and developed another stroke and she became aphasic in end of the august, she was at [facility] Pegged and foleys cather was put in, she was in [facility] for 6 months, he does not know the reason why they kept her there for 6 month, she was discharged home with him in January, he was given her bolus feeding, he notice she was breathing hard and brought her here.   we discussed about management she is getting her for aspiration as well as stage 4 decubitus ulcer with infection, as per him at [facility] she developed stage 2 ulcer, we discussed about her advance directive, he is her only relative here and he wanted her to be DNR and DNI, he is also interested in meeting with Palliative and hospice team, will have meeting on Monday, MOLTS form filled and signed, mean while will continued with medical management and no aggressive measures.

## 2018-02-23 NOTE — PROGRESS NOTE ADULT - PROBLEM SELECTOR PLAN 3
Likely due to sacral decub, stage 4 with necrotic margin, advanced directives and goals of care discuss with Family as mentioned above, given advanced aged, bedbound, non-verbal, patient was made DNI and DNI as per family wishes, Hospice and Palliative care meeting tomorrow,  Lactate normal, sepsis resolved, normotensive at this time, patient wound cultures is growing ESBL Ecoli and citbactor, d/c zosyn and vanc and was given levofloxacin, patient grandson had meeting with Hospice team today and wanted to make her complete comfort and care and wanted meds make her comfortable, no more antibiotics, wound dressings. Likely due to sacral decub, stage 4 with necrotic margin, advanced directives and goals of care discuss with Family as mentioned above, given advanced aged, bedbound, non-verbal, patient was made DNI and DNI as per family wishes, Hospice and Palliative care meeting tomorrow,  Lactate normal, sepsis resolved, normotensive at this time, patient wound cultures is growing ESBL Ecoli and citbactor, d/c zosyn and vanc and was given levofloxacin, patient grandson had meeting with Hospice team today and wanted to make her complete comfort and care and wanted meds make her comfortable, no more antibiotics, wound dressings to continue to keep it clean.

## 2018-02-24 PROCEDURE — 99232 SBSQ HOSP IP/OBS MODERATE 35: CPT

## 2018-02-24 RX ADMIN — SCOPALAMINE 1 PATCH: 1 PATCH, EXTENDED RELEASE TRANSDERMAL at 14:16

## 2018-02-24 RX ADMIN — ROBINUL 0.2 MILLIGRAM(S): 0.2 INJECTION INTRAMUSCULAR; INTRAVENOUS at 14:15

## 2018-02-24 RX ADMIN — Medication 500 MILLIGRAM(S): at 05:58

## 2018-02-24 RX ADMIN — ROBINUL 0.2 MILLIGRAM(S): 0.2 INJECTION INTRAMUSCULAR; INTRAVENOUS at 05:59

## 2018-02-24 RX ADMIN — ROBINUL 0.2 MILLIGRAM(S): 0.2 INJECTION INTRAMUSCULAR; INTRAVENOUS at 23:05

## 2018-02-24 RX ADMIN — ROBINUL 0.2 MILLIGRAM(S): 0.2 INJECTION INTRAMUSCULAR; INTRAVENOUS at 20:37

## 2018-02-24 NOTE — PROGRESS NOTE ADULT - PROBLEM SELECTOR PLAN 3
Likely due to sacral decub, stage 4 with necrotic margin, advanced directives and goals of care discuss with Family as mentioned above, given advanced aged, bedbound, non-verbal, patient was made DNI and DNI as per family wishes, Hospice and Palliative care meeting tomorrow,  Lactate normal, sepsis resolved, normotensive at this time, patient wound cultures is growing ESBL Ecoli and citbactor, d/c zosyn and vanc and was given levofloxacin, patient grandson had meeting with Hospice team today and wanted to make her complete comfort and care and wanted meds make her comfortable, no more antibiotics, wound dressings to continue to keep it clean.

## 2018-02-24 NOTE — PROGRESS NOTE ADULT - SUBJECTIVE AND OBJECTIVE BOX
LEÓN CASTILLO    [id number]    89y      Female    Patient is a 89y old  Female who presents with a chief complaint of SOB (17 Feb 2018 03:01)      INTERVAL HPI/OVERNIGHT EVENTS:    Looks comfortable, unable to get much info, because of stroke    REVIEW OF SYSTEMS:    Unable to get much info      Vital Signs Last 24 Hrs  T(C): 37 (24 Feb 2018 09:55), Max: 38 (24 Feb 2018 05:39)  T(F): 98.6 (24 Feb 2018 09:55), Max: 100.4 (24 Feb 2018 05:39)  HR: 90 (24 Feb 2018 09:55) (71 - 90)  BP: 116/69 (24 Feb 2018 09:55) (116/69 - 161/75)  BP(mean): --  RR: 18 (24 Feb 2018 09:55) (16 - 19)  SpO2: 99% (24 Feb 2018 09:55) (94% - 99%)    PHYSICAL EXAM:    GENERAL: Elderly female looking comfortable  NECK: soft, Supple, No JVD,   CHEST/LUNG: decrease air entry bilaterally; No wheezing  HEART: S1S2+, Regular rate and rhythm; No murmurs  ABDOMEN: Soft, Nontender, Nondistended; Bowel sounds present  EXTREMITIES:  1+ Peripheral Pulses, right arm swelling.   SKIN: Stage 4 decubitus sacral ulcer 4, 7x8cm, w/ some necrotic tissue, foul smelling wound.   NEURO: she is Aphasic, not participating in the exam, spontaneously opening and closing her eyes      MEDICATIONS  (STANDING):  glycopyrrolate Injectable 0.2 milliGRAM(s) IV Push every 6 hours  scopolamine   Patch 1 Patch Transdermal every 3 days    MEDICATIONS  (PRN):  acetaminophen    Suspension 500 milliGRAM(s) Oral four times a day PRN fever and pain  morphine  - Injectable 2 milliGRAM(s) IV Push every 4 hours PRN before changing dressings, for pain and SOB.

## 2018-02-24 NOTE — PROGRESS NOTE ADULT - PROBLEM SELECTOR PLAN 1
Currently sat of 97% on RA, BP normal, PE is in differential however more likely had mucous plug or aspirating on oral secretions. 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

## 2018-02-24 NOTE — PROGRESS NOTE ADULT - ASSESSMENT
89 y/o female with resolved SOB, Stage 4 sacral decub, Young catheter POA, Hx of CVA with right sided paresis, s/p PEG for dysphagia  caryn with patient Nephew Mr Sanchez Bello, we had long discussion about the issues and plan of care, according to him she came from [facility] in June 2017, after one [place] of arrival to [place], she got stroke and was admitted at [facility], where she was treated for 1 week, then discharged home, then she was admitted again because of SOB resulted from aspiration pneumonia  and developed another stroke and she became aphasic in end of the august, she was at [facility] Pegged and foleys cather was put in, she was in [facility] for 6 months, he does not know the reason why they kept her there for 6 month, she was discharged home with him in January, he was given her bolus feeding, he notice she was breathing hard and brought her here.   we discussed about management she is getting her for aspiration as well as stage 4 decubitus ulcer with infection, as per him at [facility] she developed stage 2 ulcer, we discussed about her advance directive, he is her only relative here and he wanted her to be DNR and DNI, he is also interested in meeting with Palliative and hospice team, will have meeting on Monday, MOLTS form filled and signed, mean while will continued with medical management and no aggressive measures.

## 2018-02-25 PROCEDURE — 99232 SBSQ HOSP IP/OBS MODERATE 35: CPT

## 2018-02-25 RX ADMIN — ROBINUL 0.2 MILLIGRAM(S): 0.2 INJECTION INTRAMUSCULAR; INTRAVENOUS at 23:27

## 2018-02-25 RX ADMIN — ROBINUL 0.2 MILLIGRAM(S): 0.2 INJECTION INTRAMUSCULAR; INTRAVENOUS at 05:07

## 2018-02-25 RX ADMIN — ROBINUL 0.2 MILLIGRAM(S): 0.2 INJECTION INTRAMUSCULAR; INTRAVENOUS at 11:36

## 2018-02-25 RX ADMIN — ROBINUL 0.2 MILLIGRAM(S): 0.2 INJECTION INTRAMUSCULAR; INTRAVENOUS at 17:37

## 2018-02-25 NOTE — PROGRESS NOTE ADULT - PROBLEM SELECTOR PLAN 5
patient has complete immobility due to stroke resulting into sever physical disability, she will be on comfort meds only as per hospice, nursing care, patient is not candidate for hospice in at this moment as per hospice team. patient has complete immobility due to stroke resulting into sever physical disability, she will be on comfort meds only as per hospice, nursing care, patient is not candidate for hospice in at this moment as per hospice team, will get re eval if she get symptomatic.

## 2018-02-25 NOTE — PROGRESS NOTE ADULT - ASSESSMENT
89 y/o female with resolved SOB, Stage 4 sacral decub, Young catheter POA, Hx of CVA with right sided paresis, s/p PEG for dysphagia  caryn with patient Nephew Mr Sanchez Bello, we had long discussion about the issues and plan of care, according to him she came from [facility] in June 2017, after one month of arrival to [place], she got stroke and was admitted at [facility], where she was treated for 1 week, then discharged home, then she was admitted again because of SOB resulted from aspiration pneumonia  and developed another stroke and she became aphasic in end of the august, she was at [facility] Pegged and foleys cather was put in, she was in [facility] for 6 months, he does not know the reason why they kept her there for 6 month, she was discharged home with him in January, he was given her bolus feeding, he notice she was breathing hard and brought her here.   we discussed about management she is getting her for aspiration as well as stage 4 decubitus ulcer with infection, as per him at [facility] she developed stage 2 ulcer, we discussed about her advance directive, he is her only relative here and he wanted her to be DNR and DNI, he is also interested in meeting with Palliative and hospice team, will have meeting on Monday, MOLTS form filled and signed, mean while will continued with medical management and no aggressive measures. 89 y/o female with resolved SOB, Stage 4 sacral decub, Young catheter POA, Hx of CVA with right sided paresis, s/p PEG for dysphagia  caryn with patient Nephew Mr Sanchez Bello, we had long discussion about the issues and plan of care, according to him she came from [facility] in June 2017, after one month of arrival to [place], she got stroke and was admitted at [facility], where she was treated for 1 week, then discharged home, then she was admitted again because of SOB resulted from aspiration pneumonia  and developed another stroke and she became aphasic in end of the august, she was at [facility] Pegged and foleys cather was put in, she was in [facility] for 6 months, he does not know the reason why they kept her there for 6 month, she was discharged home with him in January, he was given her bolus feeding, he notice she was breathing hard and brought her here.   we discussed about management she is getting her for aspiration as well as stage 4 decubitus ulcer with infection, as per him at [facility] she developed stage 2 ulcer, we discussed about her advance directive, he is her only relative here and he wanted her to be DNR and DNI,   patient was admitted under medicine under impression of SOB, likely had mucous plug or aspirating on oral secretions, No clear evidence of pneumonic process, patient was not looking like in any respiratory distress, CT chest obtained showed No pneumonia, Small left pleural effusion with left base compressive atelectasis, 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   Her SOB resolved,  she was also found to have Sepsis likely due to sacral decub, stage 4 with necrotic margin, advanced directives and goals of care discuss with Family as mentioned above, given advanced aged, bedbound, non-verbal, patient was made DNI and DNI, Lactate normal, sepsis resolved, patient wound cultures was growing ESBL Ecoli and citbactor, d/emil zosyn and vanc and was given levofloxacin as per sensitivity, patient grandson had meeting with Hospice team and wanted to make her complete comfort and care and wanted meds make her comfortable, no more antibiotics, wound dressings to continue to keep it clean.   patient is not candidate for hospice in at this moment as per hospice team, will get re eval once she has symptoms.

## 2018-02-25 NOTE — PROGRESS NOTE ADULT - SUBJECTIVE AND OBJECTIVE BOX
LEÓN CASTILLO    [id number]    89y      Female    Patient is a 89y old  Female who presents with a chief complaint of SOB (17 Feb 2018 03:01)      INTERVAL HPI/OVERNIGHT EVENTS:    Looks comfortable, unable to get much info, because of stroke    REVIEW OF SYSTEMS:    Unable to get much info      Vital Signs Last 24 Hrs  T(C): 36.9 (25 Feb 2018 05:13), Max: 37.3 (24 Feb 2018 20:21)  T(F): 98.4 (25 Feb 2018 05:13), Max: 99.1 (24 Feb 2018 20:21)  HR: 68 (25 Feb 2018 05:13) (68 - 85)  BP: 150/69 (25 Feb 2018 05:13) (135/68 - 150/69)  RR: 18 (25 Feb 2018 05:13) (18 - 18)  SpO2: 95% (25 Feb 2018 05:13) (95% - 97%)    PHYSICAL EXAM:    GENERAL: Elderly female looking comfortable  NECK: soft, Supple, No JVD,   CHEST/LUNG: decrease air entry bilaterally; No wheezing  HEART: S1S2+, Regular rate and rhythm; No murmurs  ABDOMEN: Soft, Nontender, Nondistended; Bowel sounds present  EXTREMITIES:  1+ Peripheral Pulses, right arm swelling.   SKIN: Stage 4 decubitus sacral ulcer 4, 7x8cm, w/ some necrotic tissue, foul smelling wound.   NEURO: she is Aphasic, not participating in the exam, spontaneously opening and closing her eyes      LABS:                  I&O's Summary    24 Feb 2018 07:01  -  25 Feb 2018 07:00  --------------------------------------------------------  IN: 0 mL / OUT: 450 mL / NET: -450 mL        MEDICATIONS  (STANDING):  glycopyrrolate Injectable 0.2 milliGRAM(s) IV Push every 6 hours  scopolamine   Patch 1 Patch Transdermal every 3 days    MEDICATIONS  (PRN):  acetaminophen    Suspension 500 milliGRAM(s) Oral four times a day PRN fever and pain  morphine  - Injectable 2 milliGRAM(s) IV Push every 4 hours PRN before changing dressings, for pain and SOB.

## 2018-02-26 PROCEDURE — 99233 SBSQ HOSP IP/OBS HIGH 50: CPT | Mod: GC

## 2018-02-26 RX ORDER — MORPHINE SULFATE 50 MG/1
2 CAPSULE, EXTENDED RELEASE ORAL EVERY 6 HOURS
Qty: 0 | Refills: 0 | Status: DISCONTINUED | OUTPATIENT
Start: 2018-02-26 | End: 2018-03-05

## 2018-02-26 RX ADMIN — MORPHINE SULFATE 2 MILLIGRAM(S): 50 CAPSULE, EXTENDED RELEASE ORAL at 23:03

## 2018-02-26 RX ADMIN — MORPHINE SULFATE 2 MILLIGRAM(S): 50 CAPSULE, EXTENDED RELEASE ORAL at 18:35

## 2018-02-26 RX ADMIN — MORPHINE SULFATE 2 MILLIGRAM(S): 50 CAPSULE, EXTENDED RELEASE ORAL at 17:28

## 2018-02-26 RX ADMIN — ROBINUL 0.2 MILLIGRAM(S): 0.2 INJECTION INTRAMUSCULAR; INTRAVENOUS at 23:02

## 2018-02-26 RX ADMIN — MORPHINE SULFATE 2 MILLIGRAM(S): 50 CAPSULE, EXTENDED RELEASE ORAL at 17:26

## 2018-02-26 RX ADMIN — ROBINUL 0.2 MILLIGRAM(S): 0.2 INJECTION INTRAMUSCULAR; INTRAVENOUS at 11:02

## 2018-02-26 RX ADMIN — ROBINUL 0.2 MILLIGRAM(S): 0.2 INJECTION INTRAMUSCULAR; INTRAVENOUS at 05:06

## 2018-02-26 RX ADMIN — ROBINUL 0.2 MILLIGRAM(S): 0.2 INJECTION INTRAMUSCULAR; INTRAVENOUS at 17:26

## 2018-02-26 RX ADMIN — MORPHINE SULFATE 2 MILLIGRAM(S): 50 CAPSULE, EXTENDED RELEASE ORAL at 23:18

## 2018-02-26 NOTE — PROGRESS NOTE ADULT - PROBLEM SELECTOR PLAN 5
History of it  with patient has complete immobility due to stroke resulting into severe physical disability - comfort care

## 2018-02-26 NOTE — PROGRESS NOTE ADULT - SUBJECTIVE AND OBJECTIVE BOX
CC: SOB (17 Feb 2018 03:01)    HPI: 89 y/o female brought in by EMS from home after family noticed patient to have resp. distress. Patient family apparently came to ED before EMS got here and left some paperwork but then left and as of now have been unable to be reached. 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 She was dcd with PEG and Ha catheter. This information was obtained from records left by family, however no other information is obtainable as family is unable to be reached. It is not know if patient has any home services, or what post hospital care has been. In ED patient with ha with turbid urine and was unable to be removed after deflating balloon and appears to have been in place for extended period of time. She also has a large 7x7 stage 4 sacral decub with tunneling and foul smelling discharge. (17 Feb 2018 03:01)    INTERVAL HPI/OVERNIGHT EVENTS: unable to obtain due to dementia/non-verbal    Vital Signs Last 24 Hrs  T(C): 36.8 (26 Feb 2018 08:30), Max: 37.5 (25 Feb 2018 16:52)  T(F): 98.2 (26 Feb 2018 08:30), Max: 99.5 (25 Feb 2018 16:52)  HR: 127 (26 Feb 2018 08:30) (53 - 127)  BP: 128/68 (26 Feb 2018 08:30) (124/61 - 138/52)  RR: 18 (26 Feb 2018 08:30) (17 - 18)  SpO2: 99% (26 Feb 2018 08:30) (97% - 99%)  I&O's Detail    25 Feb 2018 07:01  -  26 Feb 2018 07:00  --------------------------------------------------------  IN:  Total IN: 0 mL    OUT:    Indwelling Catheter - Urethral: 500 mL    Stool: 30 mL  Total OUT: 530 mL    Total NET: -530 mL    MEDICATIONS  (STANDING):  glycopyrrolate Injectable 0.2 milliGRAM(s) IV Push every 6 hours  scopolamine   Patch 1 Patch Transdermal every 3 days    MEDICATIONS  (PRN):  acetaminophen    Suspension 500 milliGRAM(s) Oral four times a day PRN fever and pain  morphine  - Injectable 2 milliGRAM(s) IV Push every 4 hours PRN before changing dressings, for pain and SOB.    RADIOLOGY & ADDITIONAL TESTS: personally vusualized    PHYSICAL EXAM:    General: elderly debilitated female in immediate respiratory distress with any exam  Eyes: PERRLA, gaze to left  Head: Normocephalic; atraumatic  ENMT: No nasal discharge; airway clear  Neck: Supple; non tender; no masses  Respiratory: + rhonchi, decreased BS at bases  Cardiovascular: Regular rate and rhythm. S1 and S2   Gastrointestinal: Soft abd, PEG in place with no leakage, + BS  Genitourinary: No costovertebral angle tenderness  Extremities: Right HP, feet in boots  Vascular: Peripheral pulses palpable 2+ bilaterally  Neurological: confused, non-verbal  Skin: Warm and dry.   Musculoskeletal: right hemiparesis

## 2018-02-26 NOTE — PROGRESS NOTE ADULT - PROBLEM SELECTOR PLAN 3
Likely due to sacral decub, stage 4 with necrotic margin, advanced directives and goals of care discuss with Family as mentioned above, given advanced aged, bedbound, non-verbal, patient was made DNI and DNI as per family wishes, Hospice care   - sepsis resolved, normotensive at this time, patient wound cultures is growing ESBL Ecoli and citrobacter - treated with zosyn and vancomycin and changed to levofloxacin - now on comfort care with no more antibiotics, only wound dressings to continue to keep it clean.

## 2018-02-26 NOTE — PROGRESS NOTE ADULT - ASSESSMENT
89 y/o female with resolved SOB, Stage 4 sacral decub, Young catheter POA, Hx of CVA with right sided paresis, s/p PEG for dysphagia

## 2018-02-27 PROCEDURE — 99232 SBSQ HOSP IP/OBS MODERATE 35: CPT | Mod: GC

## 2018-02-27 RX ADMIN — MORPHINE SULFATE 2 MILLIGRAM(S): 50 CAPSULE, EXTENDED RELEASE ORAL at 00:00

## 2018-02-27 RX ADMIN — SCOPALAMINE 1 PATCH: 1 PATCH, EXTENDED RELEASE TRANSDERMAL at 15:31

## 2018-02-27 RX ADMIN — MORPHINE SULFATE 2 MILLIGRAM(S): 50 CAPSULE, EXTENDED RELEASE ORAL at 05:03

## 2018-02-27 RX ADMIN — MORPHINE SULFATE 2 MILLIGRAM(S): 50 CAPSULE, EXTENDED RELEASE ORAL at 23:52

## 2018-02-27 RX ADMIN — MORPHINE SULFATE 2 MILLIGRAM(S): 50 CAPSULE, EXTENDED RELEASE ORAL at 11:24

## 2018-02-27 RX ADMIN — MORPHINE SULFATE 2 MILLIGRAM(S): 50 CAPSULE, EXTENDED RELEASE ORAL at 18:28

## 2018-02-27 RX ADMIN — MORPHINE SULFATE 2 MILLIGRAM(S): 50 CAPSULE, EXTENDED RELEASE ORAL at 17:43

## 2018-02-27 RX ADMIN — ROBINUL 0.2 MILLIGRAM(S): 0.2 INJECTION INTRAMUSCULAR; INTRAVENOUS at 23:37

## 2018-02-27 RX ADMIN — MORPHINE SULFATE 2 MILLIGRAM(S): 50 CAPSULE, EXTENDED RELEASE ORAL at 23:37

## 2018-02-27 RX ADMIN — MORPHINE SULFATE 2 MILLIGRAM(S): 50 CAPSULE, EXTENDED RELEASE ORAL at 05:02

## 2018-02-27 RX ADMIN — ROBINUL 0.2 MILLIGRAM(S): 0.2 INJECTION INTRAMUSCULAR; INTRAVENOUS at 17:40

## 2018-02-27 RX ADMIN — MORPHINE SULFATE 2 MILLIGRAM(S): 50 CAPSULE, EXTENDED RELEASE ORAL at 11:26

## 2018-02-27 RX ADMIN — MORPHINE SULFATE 2 MILLIGRAM(S): 50 CAPSULE, EXTENDED RELEASE ORAL at 05:18

## 2018-02-27 RX ADMIN — MORPHINE SULFATE 2 MILLIGRAM(S): 50 CAPSULE, EXTENDED RELEASE ORAL at 17:41

## 2018-02-27 RX ADMIN — ROBINUL 0.2 MILLIGRAM(S): 0.2 INJECTION INTRAMUSCULAR; INTRAVENOUS at 11:24

## 2018-02-27 RX ADMIN — SCOPALAMINE 1 PATCH: 1 PATCH, EXTENDED RELEASE TRANSDERMAL at 11:26

## 2018-02-27 RX ADMIN — ROBINUL 0.2 MILLIGRAM(S): 0.2 INJECTION INTRAMUSCULAR; INTRAVENOUS at 05:02

## 2018-02-27 NOTE — PROGRESS NOTE ADULT - PROBLEM SELECTOR PLAN 1
gurgling/cough with any irritation - likely chronic aspiration due to dysphagia with CVA - improved off TF - comfort care with robinul, scopolamine, tylenol, morphine

## 2018-02-27 NOTE — PROGRESS NOTE ADULT - ASSESSMENT
89 y/o female with resolved SOB, Stage 4 sacral decub, Young catheter POA, Hx of CVA with right sided paresis, s/p PEG for dysphagia

## 2018-02-27 NOTE — PROGRESS NOTE ADULT - SUBJECTIVE AND OBJECTIVE BOX
CC: SOB (17 Feb 2018 03:01)    HPI: 89 y/o female brought in by EMS from home after family noticed patient to have resp. distress. Patient family apparently came to ED before EMS got here and left some paperwork but then left and as of now have been unable to be reached. 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 She was dcd with PEG and Ha catheter. This information was obtained from records left by family, however no other information is obtainable as family is unable to be reached. It is not know if patient has any home services, or what post hospital care has been. In ED patient with ha with turbid urine and was unable to be removed after deflating balloon and appears to have been in place for extended period of time. She also has a large 7x7 stage 4 sacral decub with tunneling and foul smelling discharge. (17 Feb 2018 03:01)    INTERVAL HPI/OVERNIGHT EVENTS: unable to obtain due to dementia/non-verbal    Vital Signs Last 24 Hrs  T(C): 36.9 (27 Feb 2018 08:43), Max: 36.9 (26 Feb 2018 22:38)  T(F): 98.4 (27 Feb 2018 08:43), Max: 98.5 (26 Feb 2018 22:38)  HR: 74 (27 Feb 2018 08:43) (73 - 74)  BP: 165/79 (27 Feb 2018 08:43) (110/60 - 165/79)  RR: 18 (27 Feb 2018 08:43) (18 - 18)  SpO2: 100% (27 Feb 2018 08:43) (99% - 100%)    MEDICATIONS  (STANDING):  glycopyrrolate Injectable 0.2 milliGRAM(s) IV Push every 6 hours  morphine  - Injectable 2 milliGRAM(s) IV Push every 6 hours  scopolamine   Patch 1 Patch Transdermal every 3 days    MEDICATIONS  (PRN):  acetaminophen    Suspension 500 milliGRAM(s) Oral four times a day PRN fever and pain  morphine  - Injectable 2 milliGRAM(s) IV Push every 4 hours PRN before changing dressings, for pain and SOB.    RADIOLOGY & ADDITIONAL TESTS: personally vusualized    PHYSICAL EXAM:    General: elderly debilitated female in immediate respiratory distress with any exam  Eyes: PERRLA, gaze to left  Head: Normocephalic; atraumatic  ENMT: No nasal discharge; airway clear  Neck: Supple; non tender; no masses  Respiratory: + rhonchi, decreased BS at bases  Cardiovascular: Regular rate and rhythm. S1 and S2   Gastrointestinal: Soft abd, PEG in place with no leakage, + BS  Genitourinary: No costovertebral angle tenderness  Extremities: Right HP, feet in boots  Vascular: Peripheral pulses palpable 2+ bilaterally  Neurological: confused, non-verbal  Skin: Warm and dry.   Musculoskeletal: right hemiparesis

## 2018-02-28 PROCEDURE — 99232 SBSQ HOSP IP/OBS MODERATE 35: CPT | Mod: GC

## 2018-02-28 RX ORDER — INFLUENZA VIRUS VACCINE 15; 15; 15; 15 UG/.5ML; UG/.5ML; UG/.5ML; UG/.5ML
0.5 SUSPENSION INTRAMUSCULAR ONCE
Qty: 0 | Refills: 0 | Status: COMPLETED | OUTPATIENT
Start: 2018-02-28 | End: 2018-02-28

## 2018-02-28 RX ADMIN — MORPHINE SULFATE 2 MILLIGRAM(S): 50 CAPSULE, EXTENDED RELEASE ORAL at 13:36

## 2018-02-28 RX ADMIN — ROBINUL 0.2 MILLIGRAM(S): 0.2 INJECTION INTRAMUSCULAR; INTRAVENOUS at 23:43

## 2018-02-28 RX ADMIN — ROBINUL 0.2 MILLIGRAM(S): 0.2 INJECTION INTRAMUSCULAR; INTRAVENOUS at 19:12

## 2018-02-28 RX ADMIN — ROBINUL 0.2 MILLIGRAM(S): 0.2 INJECTION INTRAMUSCULAR; INTRAVENOUS at 13:07

## 2018-02-28 RX ADMIN — MORPHINE SULFATE 2 MILLIGRAM(S): 50 CAPSULE, EXTENDED RELEASE ORAL at 23:42

## 2018-02-28 RX ADMIN — ROBINUL 0.2 MILLIGRAM(S): 0.2 INJECTION INTRAMUSCULAR; INTRAVENOUS at 05:08

## 2018-02-28 RX ADMIN — MORPHINE SULFATE 2 MILLIGRAM(S): 50 CAPSULE, EXTENDED RELEASE ORAL at 13:06

## 2018-02-28 RX ADMIN — MORPHINE SULFATE 2 MILLIGRAM(S): 50 CAPSULE, EXTENDED RELEASE ORAL at 05:23

## 2018-02-28 RX ADMIN — MORPHINE SULFATE 2 MILLIGRAM(S): 50 CAPSULE, EXTENDED RELEASE ORAL at 05:08

## 2018-02-28 RX ADMIN — MORPHINE SULFATE 2 MILLIGRAM(S): 50 CAPSULE, EXTENDED RELEASE ORAL at 19:13

## 2018-02-28 NOTE — CHART NOTE - NSCHARTNOTEFT_GEN_A_CORE
Source: Pt with dementia, aphasic.     Current Diet: No diet order/ Offer tube feeds - comfort measures only- awaiting hospice placement     Current Weight: (2/20) 78kg    % Weight Change     Pertinent Medications: MEDICATIONS  (STANDING):  glycopyrrolate Injectable 0.2 milliGRAM(s) IV Push every 6 hours  morphine  - Injectable 2 milliGRAM(s) IV Push every 6 hours  scopolamine   Patch 1 Patch Transdermal every 3 days    MEDICATIONS  (PRN):  acetaminophen    Suspension 500 milliGRAM(s) Oral four times a day PRN fever and pain    Pertinent Labs: Glu 194    Skin: stage 4 sacral     Estimated Needs:   [ X] no change since previous assessment  [ ] recalculated:     Current Nutrition Diagnosis:  Current medical/surgical condition precludes nutrition intervention at this time. Patient followed by Palliative Care.      Recommendations: Patient, family, MD wishes     Monitoring and Evaluation:    [X] Weights  [X] Follow up per protocol [X] Labs: Source: Pt with dementia, aphasic.     Current Diet: No diet order/ Off tube feeds - comfort measures only- awaiting hospice placement     Current Weight: (2/20) 78kg    % Weight Change     Pertinent Medications: MEDICATIONS  (STANDING):  glycopyrrolate Injectable 0.2 milliGRAM(s) IV Push every 6 hours  morphine  - Injectable 2 milliGRAM(s) IV Push every 6 hours  scopolamine   Patch 1 Patch Transdermal every 3 days    MEDICATIONS  (PRN):  acetaminophen    Suspension 500 milliGRAM(s) Oral four times a day PRN fever and pain    Pertinent Labs: Glu 194    Skin: stage 4 sacral     Estimated Needs:   [ X] no change since previous assessment  [ ] recalculated:     Current Nutrition Diagnosis:  Current medical/surgical condition precludes nutrition intervention at this time. Patient followed by Palliative Care.      Recommendations: Patient, family, MD wishes     Monitoring and Evaluation:    [X] Weights  [X] Follow up per protocol [X] Labs:

## 2018-02-28 NOTE — GOALS OF CARE CONVERSATION - PERSONAL ADVANCE DIRECTIVE - NS PRO AD PATIENT TYPE ON CHART
Do Not Resuscitate (DNR)/Medical Orders for Life-Sustaining Treatment (MOLST)
Medical Orders for Life-Sustaining Treatment (MOLST)/Do Not Resuscitate (DNR)
Do Not Resuscitate (DNR)/Medical Orders for Life-Sustaining Treatment (MOLST)

## 2018-02-28 NOTE — PROGRESS NOTE ADULT - SUBJECTIVE AND OBJECTIVE BOX
CC: SOB (17 Feb 2018 03:01)    HPI: 89 y/o female brought in by EMS from home after family noticed patient to have resp. distress. Patient family apparently came to ED before EMS got here and left some paperwork but then left and as of now have been unable to be reached. 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 She was dcd with PEG and Ha catheter. This information was obtained from records left by family, however no other information is obtainable as family is unable to be reached. It is not know if patient has any home services, or what post hospital care has been. In ED patient with ha with turbid urine and was unable to be removed after deflating balloon and appears to have been in place for extended period of time. She also has a large 7x7 stage 4 sacral decub with tunneling and foul smelling discharge. (17 Feb 2018 03:01)    INTERVAL HPI/OVERNIGHT EVENTS: unable to obtain due to dementia/non-verbal    Vital Signs Last 24 Hrs  T(C): 37 (28 Feb 2018 08:30), Max: 37.1 (27 Feb 2018 16:55)  T(F): 98.6 (28 Feb 2018 08:30), Max: 98.7 (27 Feb 2018 16:55)  HR: 84 (28 Feb 2018 08:30) (81 - 87)  BP: 174/84 (28 Feb 2018 08:30) (128/69 - 174/84)  RR: 19 (28 Feb 2018 08:30) (18 - 19)  SpO2: 99% (28 Feb 2018 08:30) (93% - 99%)    MEDICATIONS  (STANDING):  glycopyrrolate Injectable 0.2 milliGRAM(s) IV Push every 6 hours  morphine  - Injectable 2 milliGRAM(s) IV Push every 6 hours  scopolamine   Patch 1 Patch Transdermal every 3 days    MEDICATIONS  (PRN):  acetaminophen    Suspension 500 milliGRAM(s) Oral four times a day PRN fever and pain  morphine  - Injectable 2 milliGRAM(s) IV Push every 4 hours PRN before changing dressings, for pain and SOB.    RADIOLOGY & ADDITIONAL TESTS: personally vusualized    PHYSICAL EXAM:    General: elderly debilitated female in immediate respiratory distress with any exam  Eyes: PERRLA, gaze to left  Head: Normocephalic; atraumatic  ENMT: No nasal discharge; airway clear  Neck: Supple; non tender; no masses  Respiratory: + rhonchi, decreased BS at bases  Cardiovascular: Regular rate and rhythm. S1 and S2   Gastrointestinal: Soft abd, PEG in place with no leakage, + BS  Genitourinary: No costovertebral angle tenderness  Extremities: Right HP, feet in boots  Vascular: Peripheral pulses palpable 2+ bilaterally  Neurological: confused, non-verbal  Skin: Warm and dry.   Musculoskeletal: right hemiparesis

## 2018-02-28 NOTE — GOALS OF CARE CONVERSATION - PERSONAL ADVANCE DIRECTIVE - CONVERSATION DETAILS
case revisited discussed with hospice medical director  Letty Erazo and   nursing Jazmin Martínez  HCN  case reviewed . pt  as per staff nurse aby  is comfortable on present regimen  pain medication  administered  atc with good effect did not appear to have  multiple prn requirements requiring titration or rotation of opiates  for  comfort .  Pt has a peg tube thru which medications could be administered  which is a home level and/or  SNF level of care  so at this time hospice medical director does not  approve pt for inpt hospice . Discussed with case management  and staff rn .
revisited pt for re eval for inpt hospice . pt does not appear actively symptomatic  no inpt hospice needs noted pt appears comfortable no acute distress . Pt would be home hospice appropriate but pt was visiting from [facility] as per chart  and has no home .  Hospice will remain available and  will continue to follow if plan of care or condition should change  discussed above c cc and  complex care coordinator Carolina Egan
We spoke at length through a  to Grandson Vic.    He understands that Grandmother is actively dying, that she won't be getting better and wants her to be kept comfortable.    I discussed having to DC peg tube feedings, because of fluid overload-not able to metabolize artifical supplement.    She will be treated for infected Decubiti-no invasive interventions  Morphine IVP when needed    Called RICKY Leon on Palliative team who met with Vic to discuss burial options with family having limited financial means

## 2018-02-28 NOTE — GOALS OF CARE CONVERSATION - PERSONAL ADVANCE DIRECTIVE - CONVERSATION/DISCUSSION
Hospice Referral
Hospice Referral
Diagnosis/MOLST Discussed/Hospice Referral/Prognosis/Treatment Options

## 2018-03-01 PROCEDURE — 99232 SBSQ HOSP IP/OBS MODERATE 35: CPT | Mod: GC

## 2018-03-01 RX ADMIN — MORPHINE SULFATE 2 MILLIGRAM(S): 50 CAPSULE, EXTENDED RELEASE ORAL at 17:39

## 2018-03-01 RX ADMIN — MORPHINE SULFATE 2 MILLIGRAM(S): 50 CAPSULE, EXTENDED RELEASE ORAL at 05:30

## 2018-03-01 RX ADMIN — ROBINUL 0.2 MILLIGRAM(S): 0.2 INJECTION INTRAMUSCULAR; INTRAVENOUS at 05:07

## 2018-03-01 RX ADMIN — ROBINUL 0.2 MILLIGRAM(S): 0.2 INJECTION INTRAMUSCULAR; INTRAVENOUS at 11:01

## 2018-03-01 RX ADMIN — MORPHINE SULFATE 2 MILLIGRAM(S): 50 CAPSULE, EXTENDED RELEASE ORAL at 12:00

## 2018-03-01 RX ADMIN — ROBINUL 0.2 MILLIGRAM(S): 0.2 INJECTION INTRAMUSCULAR; INTRAVENOUS at 17:02

## 2018-03-01 RX ADMIN — MORPHINE SULFATE 2 MILLIGRAM(S): 50 CAPSULE, EXTENDED RELEASE ORAL at 11:01

## 2018-03-01 RX ADMIN — MORPHINE SULFATE 2 MILLIGRAM(S): 50 CAPSULE, EXTENDED RELEASE ORAL at 05:06

## 2018-03-01 RX ADMIN — MORPHINE SULFATE 2 MILLIGRAM(S): 50 CAPSULE, EXTENDED RELEASE ORAL at 00:01

## 2018-03-01 RX ADMIN — MORPHINE SULFATE 2 MILLIGRAM(S): 50 CAPSULE, EXTENDED RELEASE ORAL at 17:02

## 2018-03-01 NOTE — PROGRESS NOTE ADULT - SUBJECTIVE AND OBJECTIVE BOX
CC: SOB (17 Feb 2018 03:01)    HPI: 89 y/o female brought in by EMS from home after family noticed patient to have resp. distress. Patient family apparently came to ED before EMS got here and left some paperwork but then left and as of now have been unable to be reached. 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 She was dcd with PEG and Ah catheter. This information was obtained from records left by family, however no other information is obtainable as family is unable to be reached. It is not know if patient has any home services, or what post hospital care has been. In ED patient with ha with turbid urine and was unable to be removed after deflating balloon and appears to have been in place for extended period of time. She also has a large 7x7 stage 4 sacral decub with tunneling and foul smelling discharge. (17 Feb 2018 03:01)    INTERVAL HPI/OVERNIGHT EVENTS: unable to obtain due to dementia/non-verbal    Vital Signs Last 24 Hrs  T(C): 37.1 (01 Mar 2018 08:40), Max: 37.7 (28 Feb 2018 16:10)  T(F): 98.8 (01 Mar 2018 08:40), Max: 99.9 (28 Feb 2018 16:10)  HR: 86 (01 Mar 2018 08:40) (79 - 100)  BP: 155/79 (01 Mar 2018 08:40) (109/69 - 157/64)  RR: 18 (01 Mar 2018 08:40) (16 - 18)  SpO2: 99% (01 Mar 2018 08:40) (98% - 100%)    MEDICATIONS  (STANDING):  glycopyrrolate Injectable 0.2 milliGRAM(s) IV Push every 6 hours  morphine  - Injectable 2 milliGRAM(s) IV Push every 6 hours  scopolamine   Patch 1 Patch Transdermal every 3 days    MEDICATIONS  (PRN):  acetaminophen    Suspension 500 milliGRAM(s) Oral four times a day PRN fever and pain  morphine  - Injectable 2 milliGRAM(s) IV Push every 4 hours PRN before changing dressings, for pain and SOB.    RADIOLOGY & ADDITIONAL TESTS: personally vusualized    PHYSICAL EXAM:    General: elderly debilitated female in immediate respiratory distress with any exam  Eyes: PERRLA, gaze to left  Head: Normocephalic; atraumatic  ENMT: No nasal discharge; airway clear  Neck: Supple; non tender; no masses  Respiratory: + rhonchi, decreased BS at bases  Cardiovascular: Regular rate and rhythm. S1 and S2   Gastrointestinal: Soft abd, PEG in place with no leakage, + BS  Genitourinary: No costovertebral angle tenderness  Extremities: Right HP, feet in boots  Vascular: Peripheral pulses palpable 2+ bilaterally  Neurological: confused, non-verbal  Skin: Warm and dry.   Musculoskeletal: right hemiparesis

## 2018-03-02 PROCEDURE — 99232 SBSQ HOSP IP/OBS MODERATE 35: CPT | Mod: GC

## 2018-03-02 RX ORDER — METOPROLOL TARTRATE 50 MG
1 TABLET ORAL
Qty: 0 | Refills: 0 | COMMUNITY

## 2018-03-02 RX ORDER — HYDRALAZINE HCL 50 MG
0 TABLET ORAL
Qty: 0 | Refills: 0 | COMMUNITY

## 2018-03-02 RX ORDER — ROBINUL 0.2 MG/ML
1 INJECTION INTRAMUSCULAR; INTRAVENOUS
Qty: 0 | Refills: 0 | COMMUNITY
Start: 2018-03-02

## 2018-03-02 RX ORDER — MORPHINE SULFATE 50 MG/1
2 CAPSULE, EXTENDED RELEASE ORAL
Qty: 0 | Refills: 0 | COMMUNITY
Start: 2018-03-02

## 2018-03-02 RX ORDER — FAMOTIDINE 10 MG/ML
0 INJECTION INTRAVENOUS
Qty: 0 | Refills: 0 | COMMUNITY

## 2018-03-02 RX ORDER — ASPIRIN/CALCIUM CARB/MAGNESIUM 324 MG
1 TABLET ORAL
Qty: 0 | Refills: 0 | COMMUNITY

## 2018-03-02 RX ORDER — SCOPALAMINE 1 MG/3D
0 PATCH, EXTENDED RELEASE TRANSDERMAL
Qty: 0 | Refills: 0 | COMMUNITY

## 2018-03-02 RX ORDER — ATORVASTATIN CALCIUM 80 MG/1
1 TABLET, FILM COATED ORAL
Qty: 0 | Refills: 0 | COMMUNITY

## 2018-03-02 RX ORDER — ACETAMINOPHEN 500 MG
0 TABLET ORAL
Qty: 0 | Refills: 0 | COMMUNITY

## 2018-03-02 RX ORDER — SCOPALAMINE 1 MG/3D
1 PATCH, EXTENDED RELEASE TRANSDERMAL
Qty: 0 | Refills: 0 | COMMUNITY
Start: 2018-03-02

## 2018-03-02 RX ORDER — CLOPIDOGREL BISULFATE 75 MG/1
1 TABLET, FILM COATED ORAL
Qty: 0 | Refills: 0 | COMMUNITY

## 2018-03-02 RX ORDER — ACETAMINOPHEN 500 MG
15.63 TABLET ORAL
Qty: 0 | Refills: 0 | COMMUNITY
Start: 2018-03-02

## 2018-03-02 RX ADMIN — SCOPALAMINE 1 PATCH: 1 PATCH, EXTENDED RELEASE TRANSDERMAL at 11:12

## 2018-03-02 RX ADMIN — MORPHINE SULFATE 2 MILLIGRAM(S): 50 CAPSULE, EXTENDED RELEASE ORAL at 17:01

## 2018-03-02 RX ADMIN — MORPHINE SULFATE 2 MILLIGRAM(S): 50 CAPSULE, EXTENDED RELEASE ORAL at 12:00

## 2018-03-02 RX ADMIN — MORPHINE SULFATE 2 MILLIGRAM(S): 50 CAPSULE, EXTENDED RELEASE ORAL at 06:15

## 2018-03-02 RX ADMIN — MORPHINE SULFATE 2 MILLIGRAM(S): 50 CAPSULE, EXTENDED RELEASE ORAL at 17:42

## 2018-03-02 RX ADMIN — ROBINUL 0.2 MILLIGRAM(S): 0.2 INJECTION INTRAMUSCULAR; INTRAVENOUS at 11:12

## 2018-03-02 RX ADMIN — ROBINUL 0.2 MILLIGRAM(S): 0.2 INJECTION INTRAMUSCULAR; INTRAVENOUS at 00:08

## 2018-03-02 RX ADMIN — MORPHINE SULFATE 2 MILLIGRAM(S): 50 CAPSULE, EXTENDED RELEASE ORAL at 23:44

## 2018-03-02 RX ADMIN — ROBINUL 0.2 MILLIGRAM(S): 0.2 INJECTION INTRAMUSCULAR; INTRAVENOUS at 17:01

## 2018-03-02 RX ADMIN — ROBINUL 0.2 MILLIGRAM(S): 0.2 INJECTION INTRAMUSCULAR; INTRAVENOUS at 05:55

## 2018-03-02 RX ADMIN — MORPHINE SULFATE 2 MILLIGRAM(S): 50 CAPSULE, EXTENDED RELEASE ORAL at 05:55

## 2018-03-02 RX ADMIN — MORPHINE SULFATE 2 MILLIGRAM(S): 50 CAPSULE, EXTENDED RELEASE ORAL at 11:12

## 2018-03-02 RX ADMIN — ROBINUL 0.2 MILLIGRAM(S): 0.2 INJECTION INTRAMUSCULAR; INTRAVENOUS at 23:21

## 2018-03-02 RX ADMIN — MORPHINE SULFATE 2 MILLIGRAM(S): 50 CAPSULE, EXTENDED RELEASE ORAL at 23:20

## 2018-03-02 RX ADMIN — MORPHINE SULFATE 2 MILLIGRAM(S): 50 CAPSULE, EXTENDED RELEASE ORAL at 00:08

## 2018-03-02 NOTE — DISCHARGE NOTE ADULT - MEDICATION SUMMARY - MEDICATIONS TO STOP TAKING
I will STOP taking the medications listed below when I get home from the hospital:    clopidogrel 75 mg oral tablet  -- 1 tab(s) by mouth once a day    famotidine 20 mg oral tablet    aspirin 325 mg oral tablet  -- 1 tab(s) by mouth once a day    atorvastatin 40 mg oral tablet  -- 1 tab(s) by mouth once a day    Lopressor 50 mg oral tablet  -- 1 tab(s) by mouth 2 times a day    hydrALAZINE 50 mg oral tablet

## 2018-03-02 NOTE — DISCHARGE NOTE ADULT - PATIENT PORTAL LINK FT
You can access the [facility] Patient Portal, offered by , by registering with the following website: [email or web address]

## 2018-03-02 NOTE — DISCHARGE NOTE ADULT - MEDICATION SUMMARY - MEDICATIONS TO CHANGE
I will SWITCH the dose or number of times a day I take the medications listed below when I get home from the hospital:    Tylenol 325 mg oral tablet

## 2018-03-02 NOTE — DISCHARGE NOTE ADULT - HOSPITAL COURSE
Assessment and Plan:   · Assessment		  89 y/o female with resolved SOB, Stage 4 sacral decub, Young catheter POA, Hx of CVA with right sided paresis, s/p PEG for dysphagia  Problem/Plan - 1:  ·  Problem: SOB (shortness of breath).  Plan: gurgling/cough with any irritation - likely chronic aspiration due to dysphagia with CVA - improved off TF - comfort care with robinul, scopolamine, tylenol, morphine.   Problem/Plan - 2:  ·  Problem: Young catheter in place on admission.  Plan: Chronic, Changed during this hospitalization - maintain for comfort care.   Problem/Plan - 3:  ·  Problem: Sepsis, due to unspecified organism.  Plan: Likely due to sacral decub, stage 4 with necrotic margin, advanced directives and goals of care discuss with Family as mentioned above, given advanced aged, bedbound, non-verbal, patient was made DNI and DNI as per family wishes, Hospice care   - sepsis resolved, normotensive at this time, patient wound cultures is growing ESBL Ecoli and citrobacter - treated with zosyn and vancomycin and changed to levofloxacin - now on comfort care with no more antibiotics, only wound dressings to continue to keep it clean.   Problem/Plan - 4:  ·  Problem: Sacral decubitus ulcer, stage IV.  Plan: See above, continue Wet to dry, frequent turning.   Problem/Plan - 5:  ·  Problem: Cerebrovascular accident (CVA), unspecified mechanism.  Plan: History of it  with patient has complete immobility due to stroke resulting into severe physical disability - comfort care.   Problem/Plan - 6:  Problem: Aortic arch aneurysm. Plan: comfort meds only.  Problem/Plan - 7:  ·  Problem: Swelling of arm.  Plan: comfort meds.   Problem/Plan - 8:  ·  Problem: HLD (hyperlipidemia).  Plan: comfort meds.   Problem/Plan - 9:  ·  Problem: HTN (hypertension).  Plan: comfort meds.   Attending Attestation:   Did not qualify for inpatient hospice - needs placement with comfort care - as per CM does not qualify due to insurance - will have pt inpatient on comfort care. Urine becomes concentrated, ketotic smell noted, dry mucosal membranes - progression of dehydration.  Discussed with [facility]  - will explore additional options of outpatient comfort care.   DC to comfort care when bed available 89 y/o female with resolved SOB, Stage 4 sacral decub, Young catheter POA, Hx of CVA with right sided paresis, s/p PEG for dysphagia met with patient Nephew Mr Sanchez Bello, we had long discussion about the issues and plan of care, according to him she came from [facility] in June 2017, after one month of arrival to [place], she got stroke and was admitted at [facility], where she was treated for 1 week, then discharged home, then she was admitted again because of SOB resulted from aspiration pneumonia  and developed another stroke and she became aphasic in end of the august, she was at [facility] Pegged and foleys cather was put in, she was in [facility] for 6 months, he does not know the reason why they kept her there for 6 month, she was discharged home with him in January, he was given her bolus feeding, he notice she was breathing hard and brought her here.   we discussed about management she is getting her for aspiration as well as stage 4 decubitus ulcer with infection, as per him at 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   Her SOB resolved,  she was also found to have Sepsis likely due to sacral decub, stage 4 with necrotic margin, advanced directives and goals of care discuss with Family as mentioned above, given advanced aged, bedbound, non-verbal, patient was made DNI and DNI, Lactate normal, sepsis resolved, patient wound cultures was growing ESBL Ecoli and citbactor, d/emil zosyn and vanc and was given levofloxacin as per sensitivity, patient grandson had meeting with Hospice team and wanted to make her complete comfort and care and wanted only meds make her comfortable, no more antibiotics, wound dressings to continue to keep it clean.   patient remained comfortable,  and case management team along with hospice team helped patient's family to make arrangement for the hospice facility, patient is being transfer to [facility].     Vital Signs Last 24 Hrs  T(C): 37.3 (07 Mar 2018 06:14), Max: 38.1 (06 Mar 2018 20:10)  T(F): 99.1 (07 Mar 2018 06:14), Max: 100.6 (06 Mar 2018 20:10)  HR: 83 (07 Mar 2018 06:14) (82 - 83)  BP: 170/82 (07 Mar 2018 06:14) (170/82 - 182/84)  RR: 18 (07 Mar 2018 06:14) (17 - 18)  SpO2: 96% (07 Mar 2018 06:14) (94% - 96%)    PHYSICAL EXAM:    GENERAL: Elderly female looking comfortable  NECK: soft, Supple, No JVD  CHEST/LUNG: decrease air entry bilaterally; No wheezing  HEART: S1S2+, Regular rate and rhythm; No murmurs  ABDOMEN: Soft, Nontender, Nondistended; Bowel sounds present  EXTREMITIES:  1+ Peripheral Pulses, right arm swelling.   SKIN: Stage 4 decubitus sacral ulcer 4, 7x8cm, w/ some necrotic tissue, foul smelling wound.   NEURO: she is Aphasic, not participating in the exam, spontaneously opening and closing her eyes    Total time spent 40minutes.

## 2018-03-02 NOTE — DISCHARGE NOTE ADULT - CARE PLAN
Principal Discharge DX:	Sepsis, due to unspecified organism  Goal:	resolved  Assessment and plan of treatment:	comfort care  Secondary Diagnosis:	Sacral decubitus ulcer, stage IV  Assessment and plan of treatment:	comfort care  Secondary Diagnosis:	SOB (shortness of breath)  Assessment and plan of treatment:	comfort care Principal Discharge DX:	Sepsis, due to unspecified organism  Goal:	resolved  Assessment and plan of treatment:	comfort care  Secondary Diagnosis:	Sacral decubitus ulcer, stage IV  Assessment and plan of treatment:	comfort care  Secondary Diagnosis:	SOB (shortness of breath)  Assessment and plan of treatment:	comfort care  Secondary Diagnosis:	Aortic arch aneurysm  Goal:	comfort and care

## 2018-03-02 NOTE — PROGRESS NOTE ADULT - SUBJECTIVE AND OBJECTIVE BOX
CC: SOB (17 Feb 2018 03:01)    HPI: 89 y/o female brought in by EMS from home after family noticed patient to have resp. distress. Patient family apparently came to ED before EMS got here and left some paperwork but then left and as of now have been unable to be reached. Patient has a history of admission to 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 She was dcd with PEG and Ha catheter. This information was obtained from records left by family, however no other information is obtainable as family is unable to be reached. It is not know if patient has any home services, or what post hospital care has been. In ED patient with ha with turbid urine and was unable to be removed after deflating balloon and appears to have been in place for extended period of time. She also has a large 7x7 stage 4 sacral decub with tunneling and foul smelling discharge. (17 Feb 2018 03:01)    INTERVAL HPI/OVERNIGHT EVENTS: unable to obtain due to dementia/non-verbal    Vital Signs Last 24 Hrs  T(C): 37.3 (02 Mar 2018 05:27), Max: 37.3 (01 Mar 2018 16:21)  T(F): 99.1 (02 Mar 2018 05:27), Max: 99.2 (01 Mar 2018 16:21)  HR: 87 (02 Mar 2018 05:27) (82 - 87)  BP: 138/67 (02 Mar 2018 05:27) (138/67 - 153/70)  RR: 16 (02 Mar 2018 05:27) (16 - 18)  SpO2: 100% (02 Mar 2018 05:27) (98% - 100%)    MEDICATIONS  (STANDING):  glycopyrrolate Injectable 0.2 milliGRAM(s) IV Push every 6 hours  morphine  - Injectable 2 milliGRAM(s) IV Push every 6 hours  scopolamine   Patch 1 Patch Transdermal every 3 days    MEDICATIONS  (PRN):  acetaminophen    Suspension 500 milliGRAM(s) Oral four times a day PRN fever and pain  morphine  - Injectable 2 milliGRAM(s) IV Push every 4 hours PRN before changing dressings, for pain and SOB.    RADIOLOGY & ADDITIONAL TESTS: personally vusualized    PHYSICAL EXAM:    General: elderly debilitated female in immediate respiratory distress with any exam  Eyes: PERRLA, gaze to left  Head: Normocephalic; atraumatic  ENMT: No nasal discharge; airway clear  Neck: Supple; non tender; no masses  Respiratory: + rhonchi, decreased BS at bases  Cardiovascular: Regular rate and rhythm. S1 and S2   Gastrointestinal: Soft abd, PEG in place with no leakage, + BS  Genitourinary: No costovertebral angle tenderness  Extremities: Right HP, feet in boots  Vascular: Peripheral pulses palpable 2+ bilaterally  Neurological: confused, non-verbal  Skin: Warm and dry.   Musculoskeletal: right hemiparesis

## 2018-03-02 NOTE — DISCHARGE NOTE ADULT - MEDICATION SUMMARY - MEDICATIONS TO TAKE
I will START or STAY ON the medications listed below when I get home from the hospital:    acetaminophen 160 mg/5 mL oral suspension  -- 15.63 milliliter(s) by mouth 4 times a day, As needed, fever and pain  -- Indication: For pain/fever    morphine  -- 2 milligram(s) intravenous every 4 hours, As Needed  -- Indication: For pain    scopolamine 1.5 mg transdermal film, extended release  -- 1 patch by transdermal patch every 3 days  -- Indication: For Secretions    glycopyrrolate 0.2 mg/mL injectable solution  -- 1 milliliter(s) injectable every 6 hours, As Needed  -- Indication: For Secretions I will START or STAY ON the medications listed below when I get home from the hospital:    acetaminophen 160 mg/5 mL oral suspension  -- 15.63 milliliter(s) by mouth 4 times a day, As needed, fever and pain  -- Indication: For pain/fever    morphine 2 mg/mL injectable solution  -- 2 milligram(s) injectable every 4 hours, As Needed pain or SOB   -- Indication: For Pain     scopolamine 1.5 mg transdermal film, extended release  -- 1 patch by transdermal patch every 3 days  -- Indication: For Secretions    glycopyrrolate 0.2 mg/mL injectable solution  -- 1 milliliter(s) injectable every 6 hours, As Needed  -- Indication: For Secretions

## 2018-03-03 PROCEDURE — 99232 SBSQ HOSP IP/OBS MODERATE 35: CPT

## 2018-03-03 RX ADMIN — ROBINUL 0.2 MILLIGRAM(S): 0.2 INJECTION INTRAMUSCULAR; INTRAVENOUS at 18:13

## 2018-03-03 RX ADMIN — MORPHINE SULFATE 2 MILLIGRAM(S): 50 CAPSULE, EXTENDED RELEASE ORAL at 06:04

## 2018-03-03 RX ADMIN — ROBINUL 0.2 MILLIGRAM(S): 0.2 INJECTION INTRAMUSCULAR; INTRAVENOUS at 23:02

## 2018-03-03 RX ADMIN — MORPHINE SULFATE 2 MILLIGRAM(S): 50 CAPSULE, EXTENDED RELEASE ORAL at 23:17

## 2018-03-03 RX ADMIN — MORPHINE SULFATE 2 MILLIGRAM(S): 50 CAPSULE, EXTENDED RELEASE ORAL at 11:59

## 2018-03-03 RX ADMIN — ROBINUL 0.2 MILLIGRAM(S): 0.2 INJECTION INTRAMUSCULAR; INTRAVENOUS at 12:34

## 2018-03-03 RX ADMIN — MORPHINE SULFATE 2 MILLIGRAM(S): 50 CAPSULE, EXTENDED RELEASE ORAL at 18:45

## 2018-03-03 RX ADMIN — MORPHINE SULFATE 2 MILLIGRAM(S): 50 CAPSULE, EXTENDED RELEASE ORAL at 12:15

## 2018-03-03 RX ADMIN — MORPHINE SULFATE 2 MILLIGRAM(S): 50 CAPSULE, EXTENDED RELEASE ORAL at 05:43

## 2018-03-03 RX ADMIN — MORPHINE SULFATE 2 MILLIGRAM(S): 50 CAPSULE, EXTENDED RELEASE ORAL at 23:02

## 2018-03-03 RX ADMIN — ROBINUL 0.2 MILLIGRAM(S): 0.2 INJECTION INTRAMUSCULAR; INTRAVENOUS at 05:42

## 2018-03-03 RX ADMIN — MORPHINE SULFATE 2 MILLIGRAM(S): 50 CAPSULE, EXTENDED RELEASE ORAL at 18:13

## 2018-03-03 NOTE — PROGRESS NOTE ADULT - PROBLEM SELECTOR PLAN 3
Likely due to sacral decub, stage 4 with necrotic margin, advanced directives and goals of care discuss with Family as mentioned above, given advanced aged, bedbound, non-verbal, patient was made DNI and DNI as per family wishes, Hospice care,   sepsis resolved, normotensive at this time, patient wound cultures is growing ESBL Ecoli and citrobacter - treated with zosyn and vancomycin and changed to levofloxacin - now on comfort care with no more antibiotics, only wound dressings to continue to keep it clean.

## 2018-03-03 NOTE — PROGRESS NOTE ADULT - PROBLEM SELECTOR PLAN 1
gurgling/cough with any irritation - likely chronic aspiration due to dysphagia with CVA - improved off TF - comfort care with robinul, scopolamine, tylenol, morphine, patient looks comfortable

## 2018-03-03 NOTE — PROGRESS NOTE ADULT - ASSESSMENT
89 y/o female with resolved SOB, Stage 4 sacral decub, Young catheter POA, Hx of CVA with right sided paresis, s/p PEG for dysphagia  caryn with patient Nephew Mr Sanchez Bello, we had long discussion about the issues and plan of care, according to him she came from [facility] in June 2017, after one [place] of arrival to [place], she got stroke and was admitted at [facility], where she was treated for 1 week, then discharged home, then she was admitted again because of SOB resulted from aspiration pneumonia  and developed another stroke and she became aphasic in end of the august, she was at [facility] Pegged and foleys cather was put in, she was in [facility] for 6 months, he does not know the reason why they kept her there for 6 month, she was discharged home with him in January, he was given her bolus feeding, he notice she was breathing hard and brought her here.   we discussed about management she is getting her for aspiration as well as stage 4 decubitus ulcer with infection, as per him at [facility] she developed stage 2 ulcer, we discussed about her advance directive, he is her only relative here and he wanted her to be DNR and DNI,   patient was admitted under medicine under impression of SOB, likely had mucous plug or aspirating on oral secretions, No clear evidence of pneumonic process, patient was not looking like in any respiratory distress, CT chest obtained showed No pneumonia, Small left pleural effusion with left base compressive atelectasis, 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   Her SOB resolved,  she was also found to have Sepsis likely due to sacral decub, stage 4 with necrotic margin, advanced directives and goals of care discuss with Family as mentioned above, given advanced aged, bedbound, non-verbal, patient was made DNI and DNI, Lactate normal, sepsis resolved, patient wound cultures was growing ESBL Ecoli and citbactor, d/emil zosyn and vanc and was given levofloxacin as per sensitivity, patient grandson had meeting with Hospice team and wanted to make her complete comfort and care and wanted meds make her comfortable, no more antibiotics, wound dressings to continue to keep it clean.   patient is not candidate for hospice in at this moment as per hospice team, will get re eval once she has symptoms.

## 2018-03-03 NOTE — PROGRESS NOTE ADULT - SUBJECTIVE AND OBJECTIVE BOX
LEÓN CASTILLO    [id number]    89y      Female    Patient is a 89y old  Female who presents with a chief complaint of SOB (17 Feb 2018 03:01)      INTERVAL HPI/OVERNIGHT EVENTS:      Looks comfortable, unable to get much info, because of stroke    REVIEW OF SYSTEMS:    Unable to get much info      Vital Signs Last 24 Hrs  T(C): 36.5 (03 Mar 2018 08:27), Max: 37.2 (02 Mar 2018 17:00)  T(F): 97.7 (03 Mar 2018 08:27), Max: 98.9 (02 Mar 2018 17:00)  HR: 77 (03 Mar 2018 08:27) (75 - 80)  BP: 165/80 (03 Mar 2018 08:27) (153/71 - 165/80)  RR: 18 (03 Mar 2018 08:27) (16 - 18)  SpO2: 99% (03 Mar 2018 08:27) (97% - 99%)    PHYSICAL EXAM:    GENERAL: Elderly female looking comfortable  NECK: soft, Supple, No JVD,   CHEST/LUNG: decrease air entry bilaterally; No wheezing  HEART: S1S2+, Regular rate and rhythm; No murmurs  ABDOMEN: Soft, Nontender, Nondistended; Bowel sounds present  EXTREMITIES:  1+ Peripheral Pulses, right arm swelling.   SKIN: Stage 4 decubitus sacral ulcer 4, 7x8cm, w/ some necrotic tissue, foul smelling wound.   NEURO: she is Aphasic, not participating in the exam, spontaneously opening and closing her eyes      02 Mar 2018 07:01  -  03 Mar 2018 07:00  --------------------------------------------------------  IN: 0 mL / OUT: 525 mL / NET: -525 mL        MEDICATIONS  (STANDING):  glycopyrrolate Injectable 0.2 milliGRAM(s) IV Push every 6 hours  morphine  - Injectable 2 milliGRAM(s) IV Push every 6 hours  scopolamine   Patch 1 Patch Transdermal every 3 days    MEDICATIONS  (PRN):  acetaminophen    Suspension 500 milliGRAM(s) Oral four times a day PRN fever and pain

## 2018-03-04 PROCEDURE — 99232 SBSQ HOSP IP/OBS MODERATE 35: CPT

## 2018-03-04 RX ADMIN — MORPHINE SULFATE 2 MILLIGRAM(S): 50 CAPSULE, EXTENDED RELEASE ORAL at 13:00

## 2018-03-04 RX ADMIN — MORPHINE SULFATE 2 MILLIGRAM(S): 50 CAPSULE, EXTENDED RELEASE ORAL at 23:30

## 2018-03-04 RX ADMIN — MORPHINE SULFATE 2 MILLIGRAM(S): 50 CAPSULE, EXTENDED RELEASE ORAL at 18:07

## 2018-03-04 RX ADMIN — ROBINUL 0.2 MILLIGRAM(S): 0.2 INJECTION INTRAMUSCULAR; INTRAVENOUS at 18:08

## 2018-03-04 RX ADMIN — MORPHINE SULFATE 2 MILLIGRAM(S): 50 CAPSULE, EXTENDED RELEASE ORAL at 18:40

## 2018-03-04 RX ADMIN — MORPHINE SULFATE 2 MILLIGRAM(S): 50 CAPSULE, EXTENDED RELEASE ORAL at 05:00

## 2018-03-04 RX ADMIN — ROBINUL 0.2 MILLIGRAM(S): 0.2 INJECTION INTRAMUSCULAR; INTRAVENOUS at 05:00

## 2018-03-04 RX ADMIN — ROBINUL 0.2 MILLIGRAM(S): 0.2 INJECTION INTRAMUSCULAR; INTRAVENOUS at 23:31

## 2018-03-04 RX ADMIN — MORPHINE SULFATE 2 MILLIGRAM(S): 50 CAPSULE, EXTENDED RELEASE ORAL at 12:23

## 2018-03-04 RX ADMIN — ROBINUL 0.2 MILLIGRAM(S): 0.2 INJECTION INTRAMUSCULAR; INTRAVENOUS at 12:23

## 2018-03-04 RX ADMIN — MORPHINE SULFATE 2 MILLIGRAM(S): 50 CAPSULE, EXTENDED RELEASE ORAL at 06:18

## 2018-03-04 NOTE — PROGRESS NOTE ADULT - PROBLEM SELECTOR PLAN 1
gurgling/cough with any irritation - likely chronic aspiration due to dysphagia with CVA - improved off TF - comfort care with robinul, scopolamine, tylenol, morphine, patient looks comfortable gurgling/cough with any irritation - likely chronic aspiration due to dysphagia with CVA - improved off TF - comfort care with robinul, scopolamine, tylenol, morphine, patient looks comfortable.

## 2018-03-04 NOTE — PROGRESS NOTE ADULT - SUBJECTIVE AND OBJECTIVE BOX
LEÓN CASTILLO    [id number]    89y      Female    Patient is a 89y old  Female who presents with a chief complaint of SOB (17 Feb 2018 03:01)      INTERVAL HPI/OVERNIGHT EVENTS:    Looks comfortable, unable to get much info, because of stroke    REVIEW OF SYSTEMS:    Unable to get much info      Vital Signs Last 24 Hrs  T(C): 37.4 (04 Mar 2018 10:12), Max: 37.7 (04 Mar 2018 05:53)  T(F): 99.3 (04 Mar 2018 10:12), Max: 99.8 (04 Mar 2018 05:53)  HR: 77 (04 Mar 2018 10:12) (77 - 82)  BP: 164/71 (04 Mar 2018 10:12) (134/62 - 164/71)  RR: 19 (04 Mar 2018 10:12) (18 - 19)  SpO2: 100% (04 Mar 2018 10:12) (99% - 100%)    PHYSICAL EXAM:    GENERAL: Elderly female looking comfortable  NECK: soft, Supple, No JVD,   CHEST/LUNG: decrease air entry bilaterally; No wheezing  HEART: S1S2+, Regular rate and rhythm; No murmurs  ABDOMEN: Soft, Nontender, Nondistended; Bowel sounds present  EXTREMITIES:  1+ Peripheral Pulses, right arm swelling.   SKIN: Stage 4 decubitus sacral ulcer 4, 7x8cm, w/ some necrotic tissue, foul smelling wound.   NEURO: she is Aphasic, not participating in the exam, spontaneously opening and closing her eyes        03 Mar 2018 07:01  -  04 Mar 2018 07:00  --------------------------------------------------------  IN: 0 mL / OUT: 600 mL / NET: -600 mL        MEDICATIONS  (STANDING):  glycopyrrolate Injectable 0.2 milliGRAM(s) IV Push every 6 hours  morphine  - Injectable 2 milliGRAM(s) IV Push every 6 hours  scopolamine   Patch 1 Patch Transdermal every 3 days    MEDICATIONS  (PRN):  acetaminophen    Suspension 500 milliGRAM(s) Oral four times a day PRN fever and pain

## 2018-03-04 NOTE — PROGRESS NOTE ADULT - PROBLEM SELECTOR PLAN 4
See above, continue Wet to dry, frequent turning See above, continue Wet to dry, frequent turning, trying to keep her comfortable

## 2018-03-04 NOTE — PROGRESS NOTE ADULT - ASSESSMENT
89 y/o female with resolved SOB, Stage 4 sacral decub, Young catheter POA, Hx of CVA with right sided paresis, s/p PEG for dysphagia  caryn with patient Nephew Mr Sanchez Bello, we had long discussion about the issues and plan of care, according to him she came from [facility] in June 2017, after one [place] of arrival to [place], she got stroke and was admitted at [facility], where she was treated for 1 week, then discharged home, then she was admitted again because of SOB resulted from aspiration pneumonia  and developed another stroke and she became aphasic in end of the august, she was at [facility] Pegged and foleys cather was put in, she was in [facility] for 6 months, he does not know the reason why they kept her there for 6 month, she was discharged home with him in January, he was given her bolus feeding, he notice she was breathing hard and brought her here.   we discussed about management she is getting her for aspiration as well as stage 4 decubitus ulcer with infection, as per him at [facility] she developed stage 2 ulcer, we discussed about her advance directive, he is her only relative here and he wanted her to be DNR and DNI,   patient was admitted under medicine under impression of SOB, likely had mucous plug or aspirating on oral secretions, No clear evidence of pneumonic process, patient was not looking like in any respiratory distress, CT chest obtained showed No pneumonia, Small left pleural effusion with left base compressive atelectasis, 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   Her SOB resolved,  she was also found to have Sepsis likely due to sacral decub, stage 4 with necrotic margin, advanced directives and goals of care discuss with Family as mentioned above, given advanced aged, bedbound, non-verbal, patient was made DNI and DNI, Lactate normal, sepsis resolved, patient wound cultures was growing ESBL Ecoli and citbactor, d/emil zosyn and vanc and was given levofloxacin as per sensitivity, patient grandson had meeting with Hospice team and wanted to make her complete comfort and care and wanted meds make her comfortable, no more antibiotics, wound dressings to continue to keep it clean.   patient is not candidate for hospice in at this moment as per hospice team, will get re eval once she has symptoms.

## 2018-03-05 PROCEDURE — 99232 SBSQ HOSP IP/OBS MODERATE 35: CPT

## 2018-03-05 RX ADMIN — MORPHINE SULFATE 2 MILLIGRAM(S): 50 CAPSULE, EXTENDED RELEASE ORAL at 06:47

## 2018-03-05 RX ADMIN — ROBINUL 0.2 MILLIGRAM(S): 0.2 INJECTION INTRAMUSCULAR; INTRAVENOUS at 12:36

## 2018-03-05 RX ADMIN — MORPHINE SULFATE 2 MILLIGRAM(S): 50 CAPSULE, EXTENDED RELEASE ORAL at 00:00

## 2018-03-05 RX ADMIN — SCOPALAMINE 1 PATCH: 1 PATCH, EXTENDED RELEASE TRANSDERMAL at 12:40

## 2018-03-05 RX ADMIN — ROBINUL 0.2 MILLIGRAM(S): 0.2 INJECTION INTRAMUSCULAR; INTRAVENOUS at 17:23

## 2018-03-05 RX ADMIN — ROBINUL 0.2 MILLIGRAM(S): 0.2 INJECTION INTRAMUSCULAR; INTRAVENOUS at 06:17

## 2018-03-05 RX ADMIN — MORPHINE SULFATE 2 MILLIGRAM(S): 50 CAPSULE, EXTENDED RELEASE ORAL at 06:17

## 2018-03-05 RX ADMIN — MORPHINE SULFATE 2 MILLIGRAM(S): 50 CAPSULE, EXTENDED RELEASE ORAL at 17:23

## 2018-03-05 RX ADMIN — MORPHINE SULFATE 2 MILLIGRAM(S): 50 CAPSULE, EXTENDED RELEASE ORAL at 12:35

## 2018-03-05 RX ADMIN — SCOPALAMINE 1 PATCH: 1 PATCH, EXTENDED RELEASE TRANSDERMAL at 12:38

## 2018-03-05 NOTE — PROGRESS NOTE ADULT - SUBJECTIVE AND OBJECTIVE BOX
LEÓN CASTILLO    [id number]    89y      Female    Patient is a 89y old  Female who presents with a chief complaint of SOB (17 Feb 2018 03:01)      INTERVAL HPI/OVERNIGHT EVENTS:    Looks comfortable, unable to get much info, because of stroke    REVIEW OF SYSTEMS:    Unable to get much info       Vital Signs Last 24 Hrs  T(C): 37.4 (05 Mar 2018 05:31), Max: 37.4 (05 Mar 2018 05:31)  T(F): 99.4 (05 Mar 2018 05:31), Max: 99.4 (05 Mar 2018 05:31)  HR: 74 (05 Mar 2018 05:31) (74 - 84)  BP: 156/71 (05 Mar 2018 05:31) (156/71 - 164/72)  RR: 18 (05 Mar 2018 05:31) (17 - 18)  SpO2: 98% (04 Mar 2018 22:25) (97% - 98%)    PHYSICAL EXAM:    GENERAL: Elderly female looking comfortable  NECK: soft, Supple, No JVD,   CHEST/LUNG: decrease air entry bilaterally; No wheezing  HEART: S1S2+, Regular rate and rhythm; No murmurs  ABDOMEN: Soft, Nontender, Nondistended; Bowel sounds present  EXTREMITIES:  1+ Peripheral Pulses, right arm swelling.   SKIN: Stage 4 decubitus sacral ulcer 4, 7x8cm, w/ some necrotic tissue, foul smelling wound.   NEURO: she is Aphasic, not participating in the exam, spontaneously opening and closing her eyes        04 Mar 2018 07:01  -  05 Mar 2018 07:00  --------------------------------------------------------  IN: 0 mL / OUT: 300 mL / NET: -300 mL        MEDICATIONS  (STANDING):  glycopyrrolate Injectable 0.2 milliGRAM(s) IV Push every 6 hours  morphine  - Injectable 2 milliGRAM(s) IV Push every 6 hours  scopolamine   Patch 1 Patch Transdermal every 3 days    MEDICATIONS  (PRN):  acetaminophen    Suspension 500 milliGRAM(s) Oral four times a day PRN fever and pain

## 2018-03-05 NOTE — PROGRESS NOTE ADULT - ASSESSMENT
89 y/o female with resolved SOB, Stage 4 sacral decub, Young catheter POA, Hx of CVA with right sided paresis, s/p PEG for dysphagia  caryn with patient Nephew Mr Sanchez Bello, we had long discussion about the issues and plan of care, according to him she came from [facility] in June 2017, after one [place] of arrival to [place], she got stroke and was admitted at [facility], where she was treated for 1 week, then discharged home, then she was admitted again because of SOB resulted from aspiration pneumonia  and developed another stroke and she became aphasic in end of the august, she was at [facility] Pegged and foleys cather was put in, she was in [facility] for 6 months, he does not know the reason why they kept her there for 6 month, she was discharged home with him in January, he was given her bolus feeding, he notice she was breathing hard and brought her here.   we discussed about management she is getting her for aspiration as well as stage 4 decubitus ulcer with infection, as per him at [facility] she developed stage 2 ulcer, we discussed about her advance directive, he is her only relative here and he wanted her to be DNR and DNI,   patient was admitted under medicine under impression of SOB, likely had mucous plug or aspirating on oral secretions, No clear evidence of pneumonic process, patient was not looking like in any respiratory distress, CT chest obtained showed No pneumonia, Small left pleural effusion with left base compressive atelectasis, 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   Her SOB resolved,  she was also found to have Sepsis likely due to sacral decub, stage 4 with necrotic margin, advanced directives and goals of care discuss with Family as mentioned above, given advanced aged, bedbound, non-verbal, patient was made DNI and DNI, Lactate normal, sepsis resolved, patient wound cultures was growing ESBL Ecoli and citbactor, d/emil zosyn and vanc and was given levofloxacin as per sensitivity, patient grandson had meeting with Hospice team and wanted to make her complete comfort and care and wanted meds make her comfortable, no more antibiotics, wound dressings to continue to keep it clean.   patient is not candidate for hospice in at this moment as per hospice team, will get re eval once she has symptoms.

## 2018-03-06 PROCEDURE — 99232 SBSQ HOSP IP/OBS MODERATE 35: CPT

## 2018-03-06 RX ADMIN — ROBINUL 0.2 MILLIGRAM(S): 0.2 INJECTION INTRAMUSCULAR; INTRAVENOUS at 00:24

## 2018-03-06 RX ADMIN — ROBINUL 0.2 MILLIGRAM(S): 0.2 INJECTION INTRAMUSCULAR; INTRAVENOUS at 05:33

## 2018-03-06 RX ADMIN — Medication 500 MILLIGRAM(S): at 20:22

## 2018-03-06 RX ADMIN — ROBINUL 0.2 MILLIGRAM(S): 0.2 INJECTION INTRAMUSCULAR; INTRAVENOUS at 17:03

## 2018-03-06 RX ADMIN — Medication 500 MILLIGRAM(S): at 05:33

## 2018-03-06 RX ADMIN — ROBINUL 0.2 MILLIGRAM(S): 0.2 INJECTION INTRAMUSCULAR; INTRAVENOUS at 11:29

## 2018-03-06 NOTE — PROGRESS NOTE ADULT - PROBLEM SELECTOR PROBLEM 8
HLD (hyperlipidemia)

## 2018-03-06 NOTE — PROGRESS NOTE ADULT - SUBJECTIVE AND OBJECTIVE BOX
LEÓN CASTILLO    [id number]    89y      Female    Patient is a 89y old  Female who presents with a chief complaint of SOB (17 Feb 2018 03:01)      INTERVAL HPI/OVERNIGHT EVENTS:    Looks comfortable, unable to get much info, because of stroke    REVIEW OF SYSTEMS:    Unable to get much info        Vital Signs Last 24 Hrs  T(C): 36.8 (06 Mar 2018 08:10), Max: 38.2 (06 Mar 2018 05:43)  T(F): 98.2 (06 Mar 2018 08:10), Max: 100.8 (06 Mar 2018 05:43)  HR: 90 (06 Mar 2018 08:10) (83 - 90)  BP: 142/74 (06 Mar 2018 08:10) (142/74 - 152/75)  RR: 17 (06 Mar 2018 08:10) (17 - 18)  SpO2: 98% (06 Mar 2018 08:10) (97% - 98%)    PHYSICAL EXAM:    GENERAL: Elderly female looking comfortable  NECK: soft, Supple, No JVD  CHEST/LUNG: decrease air entry bilaterally; No wheezing  HEART: S1S2+, Regular rate and rhythm; No murmurs  ABDOMEN: Soft, Nontender, Nondistended; Bowel sounds present  EXTREMITIES:  1+ Peripheral Pulses, right arm swelling.   SKIN: Stage 4 decubitus sacral ulcer 4, 7x8cm, w/ some necrotic tissue, foul smelling wound.   NEURO: she is Aphasic, not participating in the exam, spontaneously opening and closing her eyes      05 Mar 2018 07:01  -  06 Mar 2018 07:00  --------------------------------------------------------  IN: 0 mL / OUT: 250 mL / NET: -250 mL        MEDICATIONS  (STANDING):  glycopyrrolate Injectable 0.2 milliGRAM(s) IV Push every 6 hours  scopolamine   Patch 1 Patch Transdermal every 3 days    MEDICATIONS  (PRN):  acetaminophen    Suspension 500 milliGRAM(s) Oral four times a day PRN fever and pain

## 2018-03-06 NOTE — PROGRESS NOTE ADULT - PROBLEM SELECTOR PROBLEM 7
Swelling of arm

## 2018-03-06 NOTE — PROGRESS NOTE ADULT - PROBLEM SELECTOR PROBLEM 2
Young catheter in place on admission

## 2018-03-06 NOTE — PROGRESS NOTE ADULT - PROBLEM SELECTOR PLAN 1
gurgling/cough with any irritation - likely chronic aspiration due to dysphagia with CVA - improved off TF - comfort care with Robinul, scopolamine, Tylenol, morphine, patient looks comfortable, family is visiting and updated

## 2018-03-06 NOTE — PROGRESS NOTE ADULT - PROVIDER SPECIALTY LIST ADULT
Hospitalist
Palliative Care
Hospitalist

## 2018-03-06 NOTE — PROGRESS NOTE ADULT - PROBLEM SELECTOR PLAN 8
comfort meds
statins
comfort meds
statins
comfort meds

## 2018-03-06 NOTE — PROGRESS NOTE ADULT - PROBLEM SELECTOR PLAN 7
comfort meds
will get an US doppler of extremity, will keep the arm elevated
US doppler of extremity showed no dvt, will keep the arm elevated
comfort meds

## 2018-03-06 NOTE — PROGRESS NOTE ADULT - PROBLEM SELECTOR PLAN 9
comfort meds
resumed metoprolol, will continue to monitor BP.
comfort meds
resumed metoprolol, will continue to monitor BP.
comfort meds

## 2018-03-06 NOTE — PROGRESS NOTE ADULT - PROBLEM SELECTOR PROBLEM 3
Sepsis, due to unspecified organism

## 2018-03-06 NOTE — PROGRESS NOTE ADULT - PROBLEM SELECTOR PLAN 6
comfort meds only.
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
comfort meds only.

## 2018-03-06 NOTE — PROGRESS NOTE ADULT - PROBLEM SELECTOR PROBLEM 1
SOB (shortness of breath)

## 2018-03-06 NOTE — PROGRESS NOTE ADULT - PROBLEM SELECTOR PROBLEM 5
Cerebrovascular accident (CVA), unspecified mechanism

## 2018-03-06 NOTE — PROGRESS NOTE ADULT - ATTENDING COMMENTS
COUNSELING:    Face to face meeting to discuss Advanced Care Planning - Time Spent ______ Minutes.  See goals of care note.    More than 50% time spent in counseling and coordinating care. _20_____ Minutes.     Thank you for the opportunity to assist with the care of this patient.   [facility] [phone].
Did not qualify for inpatient hospice - needs placement with comfort care
Hospice team is working to see if she is candidate for the [facility]
Did not qualify for inpatient hospice - needs placement with comfort care - as per CM does not qualify due to insurance - will have pt inpatient on comfort care. Urine becomes concentrated, ketotic smell noted, dry mucosal membranes - progression of dehydration.  Discussed with [facility]  - will explore additional options of outpatient comfort care
Did not qualify for inpatient hospice - needs placement with comfort care - as per CM does not qualify due to insurance - will have pt inpatient on comfort care
Did not qualify for inpatient hospice - needs placement with comfort care - as per CM does not qualify due to insurance - will have pt inpatient on comfort care. Urine becomes concentrated, ketotic smell noted, dry mucosal membranes - progression of dehydration.
Did not qualify for inpatient hospice - needs placement with comfort care - as per CM does not qualify due to insurance - will have pt inpatient on comfort care. Urine becomes concentrated, ketotic smell noted, dry mucosal membranes - progression of dehydration.  Discussed with [facility]  - will explore additional options of outpatient comfort care
Did not qualify for inpatient hospice - needs placement with comfort care - as per CM does not qualify due to insurance - will have pt inpatient on comfort care. Urine becomes concentrated, ketotic smell noted, dry mucosal membranes - progression of dehydration.  Discussed with [facility]  - will explore additional options of outpatient comfort care
Did not qualify for inpatient hospice - needs placement with comfort care - as per CM does not qualify due to insurance - will have pt inpatient on comfort care. Urine becomes concentrated, ketotic smell noted, dry mucosal membranes - progression of dehydration.  Discussed with [facility]  - will explore additional options of outpatient comfort care, case management and social workers are on board.
Did not qualify for inpatient hospice - needs placement with comfort care - as per CM does not qualify due to insurance - will have pt inpatient on comfort care. Urine becomes concentrated, ketotic smell noted, dry mucosal membranes - progression of dehydration.  Discussed with [facility]  - will explore additional options of outpatient comfort care, case management and social workers are on board.
Will continue with current comfort and care and nursing care.
Will continue with current comfort and care and nursing care.

## 2018-03-06 NOTE — PROGRESS NOTE ADULT - PROBLEM SELECTOR PROBLEM 9
HTN (hypertension)

## 2018-03-06 NOTE — PROGRESS NOTE ADULT - PROBLEM SELECTOR PROBLEM 4
Sacral decubitus ulcer, stage IV

## 2018-03-06 NOTE — PROGRESS NOTE ADULT - ASSESSMENT
89 y/o female with resolved SOB, Stage 4 sacral decub, Young catheter POA, Hx of CVA with right sided paresis, s/p PEG for dysphagia  caryn with patient Nephew Mr Sanchez Bello, we had long discussion about the issues and plan of care, according to him she came from [facility] in June 2017, after one month of arrival to [place], she got stroke and was admitted at [facility], where she was treated for 1 week, then discharged home, then she was admitted again because of SOB resulted from aspiration pneumonia  and developed another stroke and she became aphasic in end of the august, she was at [facility] Pegged and foleys cather was put in, she was in [facility] for 6 months, he does not know the reason why they kept her there for 6 month, she was discharged home with him in January, he was given her bolus feeding, he notice she was breathing hard and brought her here.   we discussed about management she is getting her for aspiration as well as stage 4 decubitus ulcer with infection, as per him at [facility] she developed stage 2 ulcer, we discussed about her advance directive, he is her only relative here and he wanted her to be DNR and DNI,   patient was admitted under medicine under impression of SOB, likely had mucous plug or aspirating on oral secretions, No clear evidence of pneumonic process, patient was not looking like in any respiratory distress, CT chest obtained showed No pneumonia, Small left pleural effusion with left base compressive atelectasis, 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   Her SOB resolved,  she was also found to have Sepsis likely due to sacral decub, stage 4 with necrotic margin, advanced directives and goals of care discuss with Family as mentioned above, given advanced aged, bedbound, non-verbal, patient was made DNI and DNI, Lactate normal, sepsis resolved, patient wound cultures was growing ESBL Ecoli and citbactor, d/emil zosyn and vanc and was given levofloxacin as per sensitivity, patient grandson had meeting with Hospice team and wanted to make her complete comfort and care and wanted meds make her comfortable, no more antibiotics, wound dressings to continue to keep it clean.   patient is not candidate for hospice in at this moment as per hospice team, will get re eval once she has symptoms.

## 2018-03-06 NOTE — PROGRESS NOTE ADULT - PROBLEM SELECTOR PLAN 2
Chronic, Changed during this hospitalization - maintain for comfort care
Changed Ha as urologist helped, as there was problem with Valve of the ha's,  U/A not c/w active infection with WBCs 2 to 3
Chronic, Changed during this hospitalization - maintain for comfort care
Chronic, Changed during this hospitalization, maintain for comfort care
Chronic, Changed during this hospitalization - maintain for comfort care
Changed Ha as urologist helped, as there was problem with Valve of the ha's,  U/A not c/w active infection with WBCs 2 to 3
Chronic, Changed during this hospitalization - maintain for comfort care

## 2018-03-06 NOTE — PROGRESS NOTE ADULT - PROBLEM SELECTOR PROBLEM 6
Aortic arch aneurysm

## 2018-03-07 VITALS
SYSTOLIC BLOOD PRESSURE: 160 MMHG | OXYGEN SATURATION: 96 % | TEMPERATURE: 99 F | DIASTOLIC BLOOD PRESSURE: 82 MMHG | HEART RATE: 88 BPM | RESPIRATION RATE: 18 BRPM

## 2018-03-07 PROCEDURE — 80202 ASSAY OF VANCOMYCIN: CPT

## 2018-03-07 PROCEDURE — 36415 COLL VENOUS BLD VENIPUNCTURE: CPT

## 2018-03-07 PROCEDURE — 96375 TX/PRO/DX INJ NEW DRUG ADDON: CPT

## 2018-03-07 PROCEDURE — 96374 THER/PROPH/DIAG INJ IV PUSH: CPT

## 2018-03-07 PROCEDURE — 99285 EMERGENCY DEPT VISIT HI MDM: CPT | Mod: 25

## 2018-03-07 PROCEDURE — 87040 BLOOD CULTURE FOR BACTERIA: CPT

## 2018-03-07 PROCEDURE — 87186 SC STD MICRODIL/AGAR DIL: CPT

## 2018-03-07 PROCEDURE — 71250 CT THORAX DX C-: CPT

## 2018-03-07 PROCEDURE — 83605 ASSAY OF LACTIC ACID: CPT

## 2018-03-07 PROCEDURE — 80048 BASIC METABOLIC PNL TOTAL CA: CPT

## 2018-03-07 PROCEDURE — 99239 HOSP IP/OBS DSCHRG MGMT >30: CPT

## 2018-03-07 PROCEDURE — 93971 EXTREMITY STUDY: CPT

## 2018-03-07 PROCEDURE — 81001 URINALYSIS AUTO W/SCOPE: CPT

## 2018-03-07 PROCEDURE — 87086 URINE CULTURE/COLONY COUNT: CPT

## 2018-03-07 PROCEDURE — 85027 COMPLETE CBC AUTOMATED: CPT

## 2018-03-07 PROCEDURE — 85610 PROTHROMBIN TIME: CPT

## 2018-03-07 PROCEDURE — 80053 COMPREHEN METABOLIC PANEL: CPT

## 2018-03-07 PROCEDURE — 71045 X-RAY EXAM CHEST 1 VIEW: CPT

## 2018-03-07 PROCEDURE — 93005 ELECTROCARDIOGRAM TRACING: CPT

## 2018-03-07 PROCEDURE — 87070 CULTURE OTHR SPECIMN AEROBIC: CPT

## 2018-03-07 PROCEDURE — T1013: CPT

## 2018-03-07 RX ORDER — MORPHINE SULFATE 50 MG/1
2 CAPSULE, EXTENDED RELEASE ORAL
Qty: 0 | Refills: 0 | COMMUNITY

## 2018-03-07 RX ADMIN — ROBINUL 0.2 MILLIGRAM(S): 0.2 INJECTION INTRAMUSCULAR; INTRAVENOUS at 06:55

## 2018-03-07 RX ADMIN — ROBINUL 0.2 MILLIGRAM(S): 0.2 INJECTION INTRAMUSCULAR; INTRAVENOUS at 00:04

## 2018-03-07 RX ADMIN — ROBINUL 0.2 MILLIGRAM(S): 0.2 INJECTION INTRAMUSCULAR; INTRAVENOUS at 12:59
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
## 2023-11-07 NOTE — PROGRESS NOTE ADULT - PROBLEM SELECTOR PLAN 1
Pulse ox sensor changed to left ear. Waveform is good , patient saturating well at this time. gurgling/cough with any irritation - likely chronic aspiration due to dysphagia with CVA - improved off TF - comfort care with Robinul, scopolamine, Tylenol, morphine, patient looks comfortable.

## 2023-11-12 NOTE — PATIENT PROFILE ADULT. - AS SC BRADEN SENSORY
(1) completely limited Spine appears normal, range of motion is not limited, no muscle or joint tenderness

## 2024-08-13 NOTE — ED ADULT NURSE NOTE - FALL HARM RISK CONCLUSION
Problem: Occupational Therapy  Goal: Occupational Therapy Goal  Description: Goals to be met by 9/10/24:    Pt will complete grooming standing at sink with LRAD with SBA.  Pt will complete UB dressing with SBA.  Pt will complete toileting with SBA using LRAD.  Pt will complete functional mobility to/from toilet and toilet transfer with SBA using LRAD.   Outcome: Progressing      Fall Risk